# Patient Record
Sex: FEMALE | Race: WHITE | URBAN - METROPOLITAN AREA
[De-identification: names, ages, dates, MRNs, and addresses within clinical notes are randomized per-mention and may not be internally consistent; named-entity substitution may affect disease eponyms.]

---

## 2018-11-02 ENCOUNTER — TRANSFERRED RECORDS (OUTPATIENT)
Dept: HEALTH INFORMATION MANAGEMENT | Facility: CLINIC | Age: 48
End: 2018-11-02

## 2018-12-07 ENCOUNTER — TRANSFERRED RECORDS (OUTPATIENT)
Dept: HEALTH INFORMATION MANAGEMENT | Facility: CLINIC | Age: 48
End: 2018-12-07

## 2018-12-12 ENCOUNTER — MEDICAL CORRESPONDENCE (OUTPATIENT)
Dept: HEALTH INFORMATION MANAGEMENT | Facility: CLINIC | Age: 48
End: 2018-12-12

## 2018-12-12 ENCOUNTER — TRANSFERRED RECORDS (OUTPATIENT)
Dept: HEALTH INFORMATION MANAGEMENT | Facility: CLINIC | Age: 48
End: 2018-12-12

## 2018-12-13 ENCOUNTER — OFFICE VISIT (OUTPATIENT)
Dept: OBGYN | Facility: CLINIC | Age: 48
End: 2018-12-13
Payer: COMMERCIAL

## 2018-12-13 ENCOUNTER — TELEPHONE (OUTPATIENT)
Dept: ONCOLOGY | Facility: CLINIC | Age: 48
End: 2018-12-13

## 2018-12-13 DIAGNOSIS — Z53.9 ERRONEOUS ENCOUNTER--DISREGARD: Primary | ICD-10-CM

## 2018-12-13 NOTE — TELEPHONE ENCOUNTER
ONCOLOGY INTAKE: Records Information      APPT INFORMATION:  Referring provider:  Kiana Ghosh   Referring provider s clinic:  [facility] [phone]  Reason for visit/diagnosis:  AIS of uterine cervix    Were the records received with the referral (via [employer])? Yes    Has patient been seen for any external appt for this diagnosis (enter clinic/location)? Please confirm with PT for further info

## 2018-12-13 NOTE — PATIENT INSTRUCTIONS
If you have any questions regarding your visit, Please contact your care team.    [employer] [facility]: [phone]      [facility] CLINIC HOURS TELEPHONE NUMBER   Livia Rodriguez DO.    THI Orta -    MILLIE Carmichael       Monday, Wednesday, Thursday and Friday, [place]  8:30a.m-5:00 p.m   [facility]  [address] [place] [address]  [phone] ask for [facility]om[facility]    Imaging [phone]       Urgent Care locations:    [facility] Saturday and Sunday   9 am - 5 pm    Monday-Friday   12 pm - 8 pm  Saturday and Sunday   9 am - 5 pm   [phone]     [facility] Labor and Delivery:  [phone]    If you need a medication refill, please contact your pharmacy. Please allow 3 business days for your refill to be completed.  As always, Thank you for trusting us with your healthcare needs!

## 2018-12-17 ENCOUNTER — MEDICAL CORRESPONDENCE (OUTPATIENT)
Dept: HEALTH INFORMATION MANAGEMENT | Facility: CLINIC | Age: 48
End: 2018-12-17

## 2019-01-02 NOTE — TELEPHONE ENCOUNTER
RECORDS STATUS - ALL OTHER DIAGNOSIS      RECORDS RECEIVED FROM:[facility]    DATE RECEIVED:January 2, 2019     NOTES STATUS DETAILS   OFFICE NOTE from referring provider Dr. Galicia - [facility] - records received    OFFICE NOTE from medical oncologist None per pt     DISCHARGE SUMMARY from hospital None per pt     DISCHARGE REPORT from the ER None per pt     OPERATIVE REPORT None per pt     MEDICATION LIST Records from [place]     CLINICAL TRIAL TREATMENTS TO DATE None per pt     LABS     PATHOLOGY REPORTS NM - they will send car when ready.    ANYTHING RELATED TO DIAGNOSIS [facility]     GENONOMIC TESTING     TYPE: None     IMAGING (NEED IMAGES & REPORT)     CT SCANS None per pt     MRI None per pt     MAMMO CE     ULTRASOUND None per pt     PET None per pt

## 2019-01-07 ENCOUNTER — ONCOLOGY VISIT (OUTPATIENT)
Dept: ONCOLOGY | Facility: CLINIC | Age: 49
End: 2019-01-07
Payer: COMMERCIAL

## 2019-01-07 ENCOUNTER — PRE VISIT (OUTPATIENT)
Dept: ONCOLOGY | Facility: CLINIC | Age: 49
End: 2019-01-07

## 2019-01-07 VITALS
RESPIRATION RATE: 16 BRPM | WEIGHT: 156.63 LBS | SYSTOLIC BLOOD PRESSURE: 151 MMHG | OXYGEN SATURATION: 99 % | HEART RATE: 92 BPM | DIASTOLIC BLOOD PRESSURE: 92 MMHG | TEMPERATURE: 98 F

## 2019-01-07 DIAGNOSIS — D06.9 ADENOCARCINOMA IN SITU (AIS) OF UTERINE CERVIX: ICD-10-CM

## 2019-01-07 DIAGNOSIS — R87.810 CERVICAL HIGH RISK HPV (HUMAN PAPILLOMAVIRUS) TEST POSITIVE: ICD-10-CM

## 2019-01-07 PROCEDURE — 88305 TISSUE EXAM BY PATHOLOGIST: CPT | Performed by: OBSTETRICS & GYNECOLOGY

## 2019-01-07 PROCEDURE — 57500 BIOPSY OF CERVIX: CPT | Performed by: OBSTETRICS & GYNECOLOGY

## 2019-01-07 PROCEDURE — 00000346 ZZHCL STATISTIC REVIEW OUTSIDE SLIDES TC 88321: Performed by: OBSTETRICS & GYNECOLOGY

## 2019-01-07 PROCEDURE — 99205 OFFICE O/P NEW HI 60 MIN: CPT | Mod: 25 | Performed by: OBSTETRICS & GYNECOLOGY

## 2019-01-07 RX ORDER — KETOROLAC TROMETHAMINE 30 MG/ML
30 INJECTION, SOLUTION INTRAMUSCULAR; INTRAVENOUS ONCE
Status: CANCELLED | OUTPATIENT
Start: 2019-01-07 | End: 2019-01-07

## 2019-01-07 RX ORDER — ACETAMINOPHEN 325 MG/1
975 TABLET ORAL ONCE
Status: CANCELLED | OUTPATIENT
Start: 2019-01-07 | End: 2019-01-07

## 2019-01-07 ASSESSMENT — PAIN SCALES - GENERAL: PAINLEVEL: NO PAIN (0)

## 2019-01-07 NOTE — NURSING NOTE
Oncology Rooming Note    January 7, 2019 10:07 AM   Cyndee Combs is a 48 year old female who presents for:    Chief Complaint   Patient presents with     Oncology Clinic Visit     New Patient     Initial Vitals: BP (!) 151/92 (BP Location: Right arm)   Pulse 92   Temp 98  F (36.7  C) (Oral)   Resp 16   Wt 71 kg (156 lb 10 oz)   SpO2 99%  There is no height or weight on file to calculate BMI. There is no height or weight on file to calculate BSA.  No Pain (0) Comment: Data Unavailable   No LMP recorded. Patient is not currently having periods (Reason: IUD).  Allergies reviewed: Yes  Medications reviewed: Yes    Medications: Medication refills not needed today.  Pharmacy name entered into EPIC: Data Unavailable      5 minutes for nursing intake (face to face time)     Vicky Stratton LPN

## 2019-01-07 NOTE — NURSING NOTE
Patient Education Note - [facility]    Relevant Diagnosis:  AIS    Teaching Topic:  Exam under anesthesia, Colposcopy, Cold knife cone biopsy    Person(s) involved in teaching:  Patient    Motivation Level:    Asks Questions:   Yes  Eager to Learn:  Yes  Cooperative:  Yes  Receptive (willing/able to accept information):  Yes  Comments:  None    Patient demonstrates understanding of the following:  Reason for the appointment, diagnosis and treatment plan:  Yes  Knowledge of proper use of medications and conditions for which they are ordered (with special attention to potential side effects or drug interactions):  Yes  Which situations necessitate calling provider and whom to contact:  Yes    Teaching Concerns:  No    Education/Instructional Materials Used/Given:     Before Your Surgery Booklet ([place])  Showering Before Surgery, CHG prep provided  Adult Pain Assessment Tool  Home Care after Cervical Cone Biopsy ([place])  [facility] ([place])  Accommodations Brochure   Phone numbers for [facility] and [address] Given to Patient    Tests ordered today: Labs (per Dr. Norwood, do be completed the day of surgery)    Post-op exam: 1-2 weeks after surgery, date TBD    Pre-op exam: N/A    Surgical consent forms faxed to [facility] Pre-Admissions at fax number [phone].  Surgery scheduling staff at [facility] also notified.    Bharat Nelson, RN, BSN, OCN  Oncology Care Coordinator  [facility]

## 2019-01-07 NOTE — PROGRESS NOTES
Consult Notes on Referred Patient    Date: 2019     Dr. Pak referring provider defined for this encounter.     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  Given these findings she was subsequently sent to the Gynecologic Cancer Clinic for new patient consultation.     Brief History:  Cyndee presented for placement of an IUD originally and had a routine pap smear. That pap smear came back abnormal as ascus and subsequent colposcopy was done which revealed AIN and CIN1. She has an IUD in now. She otherwise is healthy - she has no history of heart disease, lung disease and diabetes.     - 2 children, both vaginal deliveries.   - She works as an , lives in [place].     18: colposcopy  Final Diagnosis:  A. Cervix, 12:00, biopsy: adenocarcinoma in situ. Low grade squamous intraepithelial lesion (CIN1)   B. Endocervix, curettage: acute and chronic cervicitis.   ASCUS/HPV18 positive    Recommend CKC to check for skip lesions, other disease, would then recommend hysterectomy.     Review of Systems:  Systemic           no weight changes; no fever; no chills; no night sweats; no appetite changes  Skin           no rashes, or lesions  Eye           no irritation; no changes in vision  Moira-Laryngeal           no dysphagia; no hoarseness   Pulmonary    no cough; no shortness of breath  Cardiovascular    no chest pain; no palpitations  Gastrointestinal    no diarrhea; no constipation; no abdominal pain; no changes in bowel  habits; no blood in stool  Genitourinary   no urinary frequency; no urinary urgency; no dysuria; no pain; no abnormal vaginal discharge; no abnormal vaginal bleeding  Breast   no breast discharge; no breast changes; no breast pain  Musculoskeletal    no myalgias; no arthralgias; no back pain  Psychiatric           no depressed  mood; no anxiety    Hematologic           no tender lymph nodes; no noticeable swellings or lumps   Endocrine    no hot flashes; no heat/cold intolerance         Neurological   no tremor; no numbness and tingling; no headaches; no difficulty  sleeping    I have reviewed and addressed the patient's review of symptoms for today's visit.     Past Medical History:  - DARRICK  - seasonal allergies    Past Surgical History:  No past surgical history     Health Maintenance:  Health Maintenance Due   Topic Date Due     PHQ-2 Q1 YR  01/25/1983     HIV SCREEN (SYSTEM ASSIGNED)  01/25/1989     PAP SCREENING Q3 YR (SYSTEM ASSIGNED)  01/25/1992     DTAP/TDAP/TD IMMUNIZATION (1 - Tdap) 01/25/1996     LIPID SCREEN Q5 YR FEMALE (SYSTEM ASSIGNED)  01/25/2016     INFLUENZA VACCINE (1) 09/01/2018     Last Pap Smear: 11/2/18 Result: ASCUS    Last Mammogram: 7/20/18              Result: normal      She has not had a history of abnormal mammograms.    Last Colonoscopy: Not done                    Current Medications:   has a current medication list which includes the following prescription(s): azithromycin and placebo.     Allergies:   Allergies   Allergen Reactions     Amoxicillin Rash      Social History:  Social History     Tobacco Use     Smoking status: Not on file   Substance Use Topics     Alcohol use: Not on file     History   Drug Use Not on file     Family History:   The patient's family history is notable for:  No family history of cancer.     Physical Exam:   BP (!) 151/92 (BP Location: Right arm)   Pulse 92   Temp 98  F (36.7  C) (Oral)   Resp 16   Wt 71 kg (156 lb 10 oz)   SpO2 99%   There is no height or weight on file to calculate BMI.    General Appearance: healthy and alert, no distress     HEENT:  no thyromegaly, no palpable nodules or masses        Cardiovascular: regular rate and rhythm, no gallops, rubs or murmurs     Respiratory: lungs clear, no rales, rhonchi or wheezes, normal diaphragmatic  excursion    Musculoskeletal: extremities non tender and without edema    Skin: no lesions or rashes     Neurological: normal gait, no gross defects     Psychiatric: appropriate mood and affect                               Hematological: normal cervical, supraclavicular and inguinal lymph nodes     Gastrointestinal:       abdomen soft, non-tender, non-distended, no organomegaly or masses    Genitourinary: External genitalia and urethral meatus appears normal.  Vagina is smooth without nodularity or masses. Raised area at 12-2:00 along cervix, very friable, bleeds easily. Biopsy was done of this area. I am concerned that this could be consistent with possible invasive disease.  Bimanual exam reveals no masses, mild nodularity at 12-2 oclock position, firmness, no parametrial fullness.  Recto-vaginal exam confirms these findings.    Cervical biopsy: consent obtained for procedure. Patient tolerated entire procedure well. Silver Nitrate applied for hemostasis.       Assessment:    Cyndee Combs is a 48 year old woman with a new diagnosis of AIN of cervix and CIN1.     Plan:    1.)   Will plan for cold knife cone at this time. However, if results from cervical biopsy are consistent with invasive disease, we will cancel cold knife cone and go ahead with radical hysterectomy with open abdominal incision, cancer staging and possible lymph node dissection. If we go ahead with ckc, will have patient RTC following surgery to discuss pathology and plan further surgical treatment. Cyndee is understanding of and in agreement with plan.   - Written and verbal consent obtained for cold knife cone of cervix, colposcopy, removal of IUD. Will have Patient RTC to consent for rad hyst if we plan to go ahead with that.   - Pre-op teaching was completed today.  Risks of surgery were discussed to include: bleeding, transfusion, infection, unintentional injury to surrounding organs/structures.     2.) Genetic risk factors were assessed  and the patient does not meet the qualifications for a referral.      3.) Labs and/or tests ordered include:  Pre-op labs. Cervical Biopsy at 2:00.      4.) Health maintenance issues addressed today include: patient is UTD with pap and mammogram. Pt has not had colonoscopy.     5.) BP: 151/92, patient reports she usually has normal BP but she is very anxious today. Instructed to follow up with primary MD regarding this.    Thank you for allowing us to participate in the care of your patient.       Sincerely,    Iraida Norwood MD    Department of Ob/Gyn and Women's Health  Division of Gynecologic Oncology  [facility]  [phone]    CC  Patient Care Team:  Rosalinda Hickman NP as PCP - General      I, Varun Valladares, am serving as a scribe to document services personally performed by Iraida Norwood MD, based upon my observations and the provider's statements to me. All documentation has been reviewed by the aforementioned doctor prior to being entered into the official medical record.     I have reviewed the above note and agree with the scribe's notation as written.

## 2019-01-07 NOTE — LETTER
2019         RE: Cyndee Combs  [address] [facility] [address]        Dear Colleague,    Thank you for referring your patient, Cyndee Combs, to the [facility]. Please see a copy of my visit note below.                            Consult Notes on Referred Patient    Date: 2019     Dr. Pak referring provider defined for this encounter.     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  Given these findings she was subsequently sent to the Gynecologic Cancer Clinic for new patient consultation.     Brief History:  Cyndee presented for placement of an IUD originally and had a routine pap smear. That pap smear came back abnormal as ascus and subsequent colposcopy was done which revealed AIN and CIN1. She has an IUD in now. She otherwise is healthy - she has no history of heart disease, lung disease and diabetes.     - 2 children, both vaginal deliveries.   - She works as an , lives in [place].     18: colposcopy  Final Diagnosis:  A. Cervix, 12:00, biopsy: adenocarcinoma in situ. Low grade squamous intraepithelial lesion (CIN1)   B. Endocervix, curettage: acute and chronic cervicitis.   ASCUS/HPV18 positive    Recommend CKC to check for skip lesions, other disease, would then recommend hysterectomy.     Review of Systems:  Systemic           no weight changes; no fever; no chills; no night sweats; no appetite changes  Skin           no rashes, or lesions  Eye           no irritation; no changes in vision  Moira-Laryngeal           no dysphagia; no hoarseness   Pulmonary    no cough; no shortness of breath  Cardiovascular    no chest pain; no palpitations  Gastrointestinal    no diarrhea; no constipation; no abdominal pain; no changes in bowel  habits; no blood in stool  Genitourinary   no urinary frequency; no urinary urgency; no  dysuria; no pain; no abnormal vaginal discharge; no abnormal vaginal bleeding  Breast   no breast discharge; no breast changes; no breast pain  Musculoskeletal    no myalgias; no arthralgias; no back pain  Psychiatric           no depressed mood; no anxiety    Hematologic           no tender lymph nodes; no noticeable swellings or lumps   Endocrine    no hot flashes; no heat/cold intolerance         Neurological   no tremor; no numbness and tingling; no headaches; no difficulty  sleeping    I have reviewed and addressed the patient's review of symptoms for today's visit.     Past Medical History:  - DARRICK  - seasonal allergies    Past Surgical History:  No past surgical history     Health Maintenance:  Health Maintenance Due   Topic Date Due     PHQ-2 Q1 YR  01/25/1983     HIV SCREEN (SYSTEM ASSIGNED)  01/25/1989     PAP SCREENING Q3 YR (SYSTEM ASSIGNED)  01/25/1992     DTAP/TDAP/TD IMMUNIZATION (1 - Tdap) 01/25/1996     LIPID SCREEN Q5 YR FEMALE (SYSTEM ASSIGNED)  01/25/2016     INFLUENZA VACCINE (1) 09/01/2018     Last Pap Smear: 11/2/18 Result: ASCUS    Last Mammogram: 7/20/18              Result: normal      She has not had a history of abnormal mammograms.    Last Colonoscopy: Not done                    Current Medications:   has a current medication list which includes the following prescription(s): azithromycin and placebo.     Allergies:   Allergies   Allergen Reactions     Amoxicillin Rash      Social History:  Social History     Tobacco Use     Smoking status: Not on file   Substance Use Topics     Alcohol use: Not on file     History   Drug Use Not on file     Family History:   The patient's family history is notable for:  No family history of cancer.     Physical Exam:   BP (!) 151/92 (BP Location: Right arm)   Pulse 92   Temp 98  F (36.7  C) (Oral)   Resp 16   Wt 71 kg (156 lb 10 oz)   SpO2 99%   There is no height or weight on file to calculate BMI.    General Appearance: healthy and alert, no  distress     HEENT:  no thyromegaly, no palpable nodules or masses        Cardiovascular: regular rate and rhythm, no gallops, rubs or murmurs     Respiratory: lungs clear, no rales, rhonchi or wheezes, normal diaphragmatic excursion    Musculoskeletal: extremities non tender and without edema    Skin: no lesions or rashes     Neurological: normal gait, no gross defects     Psychiatric: appropriate mood and affect                               Hematological: normal cervical, supraclavicular and inguinal lymph nodes     Gastrointestinal:       abdomen soft, non-tender, non-distended, no organomegaly or masses    Genitourinary: External genitalia and urethral meatus appears normal.  Vagina is smooth without nodularity or masses. Raised area at 12-2:00 along cervix, very friable, bleeds easily. Biopsy was done of this area. I am concerned that this could be consistent with possible invasive disease.  Bimanual exam reveals no masses, mild nodularity at 12-2 oclock position, firmness, no parametrial fullness.  Recto-vaginal exam confirms these findings.    Cervical biopsy: consent obtained for procedure. Patient tolerated entire procedure well. Silver Nitrate applied for hemostasis.       Assessment:    Cyndee Combs is a 48 year old woman with a new diagnosis of AIN of cervix and CIN1.     Plan:    1.)   Will plan for cold knife cone at this time. However, if results from cervical biopsy are consistent with invasive disease, we will cancel cold knife cone and go ahead with radical hysterectomy with open abdominal incision, cancer staging and possible lymph node dissection. If we go ahead with ckc, will have patient RTC following surgery to discuss pathology and plan further surgical treatment. Cyndee is understanding of and in agreement with plan.   - Written and verbal consent obtained for cold knife cone of cervix, colposcopy, removal of IUD. Will have Patient RTC to consent for rad hyst if we plan to go ahead with  that.   - Pre-op teaching was completed today.  Risks of surgery were discussed to include: bleeding, transfusion, infection, unintentional injury to surrounding organs/structures.     2.) Genetic risk factors were assessed and the patient does not meet the qualifications for a referral.      3.) Labs and/or tests ordered include:  Pre-op labs. Cervical Biopsy at 2:00.      4.) Health maintenance issues addressed today include: patient is UTD with pap and mammogram. Pt has not had colonoscopy.     5.) BP: 151/92, patient reports she usually has normal BP but she is very anxious today. Instructed to follow up with primary MD regarding this.    Thank you for allowing us to participate in the care of your patient.       Sincerely,    Iraida Norwood MD    Department of Ob/Gyn and Women's Health  Division of Gynecologic Oncology  [facility]  [phone]      Patient Care Team:  Rosalinda Hickman NP as PCP - General      I, Varun Valladares, am serving as a scribe to document services personally performed by Iraida Norwood MD, based upon my observations and the provider's statements to me. All documentation has been reviewed by the aforementioned doctor prior to being entered into the official medical record.     I have reviewed the above note and agree with the scribe's notation as written.      Again, thank you for allowing me to participate in the care of your patient.        Sincerely,        Iraida Norwood MD

## 2019-01-10 LAB
COPATH REPORT: NORMAL
COPATH REPORT: NORMAL

## 2019-01-16 ENCOUNTER — ANESTHESIA EVENT (OUTPATIENT)
Dept: SURGERY | Facility: AMBULATORY SURGERY CENTER | Age: 49
End: 2019-01-16

## 2019-01-16 RX ORDER — ACETAMINOPHEN 325 MG/1
975 TABLET ORAL ONCE
Status: CANCELLED | OUTPATIENT
Start: 2019-01-16 | End: 2019-01-16

## 2019-01-17 ENCOUNTER — HOSPITAL ENCOUNTER (OUTPATIENT)
Facility: AMBULATORY SURGERY CENTER | Age: 49
End: 2019-01-17
Attending: OBSTETRICS & GYNECOLOGY
Payer: COMMERCIAL

## 2019-01-17 ENCOUNTER — ANESTHESIA (OUTPATIENT)
Dept: SURGERY | Facility: AMBULATORY SURGERY CENTER | Age: 49
End: 2019-01-17

## 2019-01-17 VITALS
RESPIRATION RATE: 16 BRPM | TEMPERATURE: 98.1 F | OXYGEN SATURATION: 97 % | WEIGHT: 156 LBS | SYSTOLIC BLOOD PRESSURE: 132 MMHG | DIASTOLIC BLOOD PRESSURE: 86 MMHG | HEIGHT: 66 IN | BODY MASS INDEX: 25.07 KG/M2 | HEART RATE: 102 BPM

## 2019-01-17 DIAGNOSIS — D06.9 ADENOCARCINOMA IN SITU (AIS) OF UTERINE CERVIX: ICD-10-CM

## 2019-01-17 DIAGNOSIS — D06.9 ADENOCARCINOMA IN SITU (AIS) OF UTERINE CERVIX: Primary | ICD-10-CM

## 2019-01-17 LAB
ABO + RH BLD: NORMAL
ABO + RH BLD: NORMAL
B-HCG SERPL-ACNC: <1 IU/L (ref 0–5)
B-HCG SERPL-ACNC: <1 IU/L (ref 0–5)
BASOPHILS # BLD AUTO: 0 10E9/L (ref 0–0.2)
BASOPHILS NFR BLD AUTO: 0.4 %
BLD GP AB SCN SERPL QL: NORMAL
BLOOD BANK CMNT PATIENT-IMP: NORMAL
DIFFERENTIAL METHOD BLD: NORMAL
EOSINOPHIL # BLD AUTO: 0.2 10E9/L (ref 0–0.7)
EOSINOPHIL NFR BLD AUTO: 2.3 %
ERYTHROCYTE [DISTWIDTH] IN BLOOD BY AUTOMATED COUNT: 12.7 % (ref 10–15)
HCG SERPL QL: NEGATIVE
HCT VFR BLD AUTO: 45.4 % (ref 35–47)
HGB BLD-MCNC: 15.4 G/DL (ref 11.7–15.7)
IMM GRANULOCYTES # BLD: 0 10E9/L (ref 0–0.4)
IMM GRANULOCYTES NFR BLD: 0.4 %
LYMPHOCYTES # BLD AUTO: 2 10E9/L (ref 0.8–5.3)
LYMPHOCYTES NFR BLD AUTO: 23.7 %
MCH RBC QN AUTO: 31.4 PG (ref 26.5–33)
MCHC RBC AUTO-ENTMCNC: 33.9 G/DL (ref 31.5–36.5)
MCV RBC AUTO: 93 FL (ref 78–100)
MONOCYTES # BLD AUTO: 0.7 10E9/L (ref 0–1.3)
MONOCYTES NFR BLD AUTO: 8.8 %
NEUTROPHILS # BLD AUTO: 5.3 10E9/L (ref 1.6–8.3)
NEUTROPHILS NFR BLD AUTO: 64.4 %
NRBC # BLD AUTO: 0 10*3/UL
NRBC BLD AUTO-RTO: 0 /100
PLATELET # BLD AUTO: 226 10E9/L (ref 150–450)
RBC # BLD AUTO: 4.9 10E12/L (ref 3.8–5.2)
SPECIMEN EXP DATE BLD: NORMAL
WBC # BLD AUTO: 8.3 10E9/L (ref 4–11)

## 2019-01-17 PROCEDURE — 86901 BLOOD TYPING SEROLOGIC RH(D): CPT | Performed by: OBSTETRICS & GYNECOLOGY

## 2019-01-17 PROCEDURE — 86900 BLOOD TYPING SEROLOGIC ABO: CPT | Performed by: OBSTETRICS & GYNECOLOGY

## 2019-01-17 PROCEDURE — 84702 CHORIONIC GONADOTROPIN TEST: CPT | Performed by: OBSTETRICS & GYNECOLOGY

## 2019-01-17 PROCEDURE — 86850 RBC ANTIBODY SCREEN: CPT | Performed by: OBSTETRICS & GYNECOLOGY

## 2019-01-17 PROCEDURE — 85025 COMPLETE CBC W/AUTO DIFF WBC: CPT | Performed by: OBSTETRICS & GYNECOLOGY

## 2019-01-17 RX ORDER — ONDANSETRON 2 MG/ML
INJECTION INTRAMUSCULAR; INTRAVENOUS PRN
Status: DISCONTINUED | OUTPATIENT
Start: 2019-01-17 | End: 2019-01-17

## 2019-01-17 RX ORDER — IODINE AND POTASSIUM IODIDE 50; 100 MG/ML; MG/ML
LIQUID ORAL PRN
Status: DISCONTINUED | OUTPATIENT
Start: 2019-01-17 | End: 2019-01-17 | Stop reason: HOSPADM

## 2019-01-17 RX ORDER — MEPERIDINE HYDROCHLORIDE 25 MG/ML
12.5 INJECTION INTRAMUSCULAR; INTRAVENOUS; SUBCUTANEOUS
Status: DISCONTINUED | OUTPATIENT
Start: 2019-01-17 | End: 2019-01-18 | Stop reason: HOSPADM

## 2019-01-17 RX ORDER — ONDANSETRON 4 MG/1
4 TABLET, ORALLY DISINTEGRATING ORAL
Status: DISCONTINUED | OUTPATIENT
Start: 2019-01-17 | End: 2019-01-18 | Stop reason: HOSPADM

## 2019-01-17 RX ORDER — BUPIVACAINE HYDROCHLORIDE 5 MG/ML
INJECTION, SOLUTION PERINEURAL PRN
Status: DISCONTINUED | OUTPATIENT
Start: 2019-01-17 | End: 2019-01-17 | Stop reason: HOSPADM

## 2019-01-17 RX ORDER — KETOROLAC TROMETHAMINE 30 MG/ML
30 INJECTION, SOLUTION INTRAMUSCULAR; INTRAVENOUS ONCE
Status: COMPLETED | OUTPATIENT
Start: 2019-01-17 | End: 2019-01-17

## 2019-01-17 RX ORDER — SODIUM CHLORIDE, SODIUM LACTATE, POTASSIUM CHLORIDE, CALCIUM CHLORIDE 600; 310; 30; 20 MG/100ML; MG/100ML; MG/100ML; MG/100ML
INJECTION, SOLUTION INTRAVENOUS CONTINUOUS
Status: DISCONTINUED | OUTPATIENT
Start: 2019-01-17 | End: 2019-01-17 | Stop reason: HOSPADM

## 2019-01-17 RX ORDER — OXYCODONE HYDROCHLORIDE 5 MG/1
5 TABLET ORAL
Status: DISCONTINUED | OUTPATIENT
Start: 2019-01-17 | End: 2019-01-18 | Stop reason: HOSPADM

## 2019-01-17 RX ORDER — FERRIC SUBSULFATE 0.21 G/G
LIQUID TOPICAL PRN
Status: DISCONTINUED | OUTPATIENT
Start: 2019-01-17 | End: 2019-01-17 | Stop reason: HOSPADM

## 2019-01-17 RX ORDER — IBUPROFEN 600 MG/1
600 TABLET, FILM COATED ORAL EVERY 6 HOURS PRN
Qty: 30 TABLET | Refills: 0 | Status: ON HOLD | OUTPATIENT
Start: 2019-01-17 | End: 2019-03-06

## 2019-01-17 RX ORDER — ACETAMINOPHEN 325 MG/1
975 TABLET ORAL ONCE
Status: COMPLETED | OUTPATIENT
Start: 2019-01-17 | End: 2019-01-17

## 2019-01-17 RX ORDER — DEXAMETHASONE SODIUM PHOSPHATE 4 MG/ML
INJECTION, SOLUTION INTRA-ARTICULAR; INTRALESIONAL; INTRAMUSCULAR; INTRAVENOUS; SOFT TISSUE PRN
Status: DISCONTINUED | OUTPATIENT
Start: 2019-01-17 | End: 2019-01-17

## 2019-01-17 RX ORDER — SCOLOPAMINE TRANSDERMAL SYSTEM 1 MG/1
1 PATCH, EXTENDED RELEASE TRANSDERMAL ONCE
Status: COMPLETED | OUTPATIENT
Start: 2019-01-17 | End: 2019-01-17

## 2019-01-17 RX ORDER — KETOROLAC TROMETHAMINE 30 MG/ML
INJECTION, SOLUTION INTRAMUSCULAR; INTRAVENOUS PRN
Status: DISCONTINUED | OUTPATIENT
Start: 2019-01-17 | End: 2019-01-17

## 2019-01-17 RX ORDER — ONDANSETRON 2 MG/ML
4 INJECTION INTRAMUSCULAR; INTRAVENOUS EVERY 30 MIN PRN
Status: DISCONTINUED | OUTPATIENT
Start: 2019-01-17 | End: 2019-01-18 | Stop reason: HOSPADM

## 2019-01-17 RX ORDER — FENTANYL CITRATE 50 UG/ML
INJECTION, SOLUTION INTRAMUSCULAR; INTRAVENOUS PRN
Status: DISCONTINUED | OUTPATIENT
Start: 2019-01-17 | End: 2019-01-17

## 2019-01-17 RX ORDER — PROPOFOL 10 MG/ML
INJECTION, EMULSION INTRAVENOUS CONTINUOUS PRN
Status: DISCONTINUED | OUTPATIENT
Start: 2019-01-17 | End: 2019-01-17

## 2019-01-17 RX ORDER — LIDOCAINE 40 MG/G
CREAM TOPICAL
Status: DISCONTINUED | OUTPATIENT
Start: 2019-01-17 | End: 2019-01-17 | Stop reason: HOSPADM

## 2019-01-17 RX ORDER — FENTANYL CITRATE 50 UG/ML
25-50 INJECTION, SOLUTION INTRAMUSCULAR; INTRAVENOUS
Status: DISCONTINUED | OUTPATIENT
Start: 2019-01-17 | End: 2019-01-17 | Stop reason: HOSPADM

## 2019-01-17 RX ORDER — ONDANSETRON 4 MG/1
4 TABLET, ORALLY DISINTEGRATING ORAL EVERY 30 MIN PRN
Status: DISCONTINUED | OUTPATIENT
Start: 2019-01-17 | End: 2019-01-18 | Stop reason: HOSPADM

## 2019-01-17 RX ORDER — OXYCODONE HYDROCHLORIDE 5 MG/1
5 TABLET ORAL EVERY 4 HOURS PRN
Status: DISCONTINUED | OUTPATIENT
Start: 2019-01-17 | End: 2019-01-18 | Stop reason: HOSPADM

## 2019-01-17 RX ORDER — LIDOCAINE HYDROCHLORIDE 20 MG/ML
INJECTION, SOLUTION INFILTRATION; PERINEURAL PRN
Status: DISCONTINUED | OUTPATIENT
Start: 2019-01-17 | End: 2019-01-17

## 2019-01-17 RX ORDER — GABAPENTIN 300 MG/1
300 CAPSULE ORAL ONCE
Status: COMPLETED | OUTPATIENT
Start: 2019-01-17 | End: 2019-01-17

## 2019-01-17 RX ORDER — ACETIC ACID 5 %
LIQUID (ML) MISCELLANEOUS PRN
Status: DISCONTINUED | OUTPATIENT
Start: 2019-01-17 | End: 2019-01-17 | Stop reason: HOSPADM

## 2019-01-17 RX ORDER — PROPOFOL 10 MG/ML
INJECTION, EMULSION INTRAVENOUS PRN
Status: DISCONTINUED | OUTPATIENT
Start: 2019-01-17 | End: 2019-01-17

## 2019-01-17 RX ORDER — IBUPROFEN 200 MG
600 TABLET ORAL
Status: DISCONTINUED | OUTPATIENT
Start: 2019-01-17 | End: 2019-01-18 | Stop reason: HOSPADM

## 2019-01-17 RX ORDER — SODIUM CHLORIDE, SODIUM LACTATE, POTASSIUM CHLORIDE, CALCIUM CHLORIDE 600; 310; 30; 20 MG/100ML; MG/100ML; MG/100ML; MG/100ML
INJECTION, SOLUTION INTRAVENOUS CONTINUOUS
Status: DISCONTINUED | OUTPATIENT
Start: 2019-01-17 | End: 2019-01-18 | Stop reason: HOSPADM

## 2019-01-17 RX ORDER — NALOXONE HYDROCHLORIDE 0.4 MG/ML
.1-.4 INJECTION, SOLUTION INTRAMUSCULAR; INTRAVENOUS; SUBCUTANEOUS
Status: DISCONTINUED | OUTPATIENT
Start: 2019-01-17 | End: 2019-01-18 | Stop reason: HOSPADM

## 2019-01-17 RX ADMIN — KETOROLAC TROMETHAMINE 30 MG: 30 INJECTION, SOLUTION INTRAMUSCULAR; INTRAVENOUS at 10:10

## 2019-01-17 RX ADMIN — FENTANYL CITRATE 50 MCG: 50 INJECTION, SOLUTION INTRAMUSCULAR; INTRAVENOUS at 10:50

## 2019-01-17 RX ADMIN — KETOROLAC TROMETHAMINE 15 MG: 30 INJECTION, SOLUTION INTRAMUSCULAR; INTRAVENOUS at 11:24

## 2019-01-17 RX ADMIN — ONDANSETRON 4 MG: 2 INJECTION INTRAMUSCULAR; INTRAVENOUS at 11:19

## 2019-01-17 RX ADMIN — SCOLOPAMINE TRANSDERMAL SYSTEM 1 PATCH: 1 PATCH, EXTENDED RELEASE TRANSDERMAL at 10:10

## 2019-01-17 RX ADMIN — LIDOCAINE HYDROCHLORIDE 60 MG: 20 INJECTION, SOLUTION INFILTRATION; PERINEURAL at 10:50

## 2019-01-17 RX ADMIN — DEXAMETHASONE SODIUM PHOSPHATE 4 MG: 4 INJECTION, SOLUTION INTRA-ARTICULAR; INTRALESIONAL; INTRAMUSCULAR; INTRAVENOUS; SOFT TISSUE at 10:56

## 2019-01-17 RX ADMIN — Medication 100 MCG: at 11:34

## 2019-01-17 RX ADMIN — PROPOFOL 200 MCG/KG/MIN: 10 INJECTION, EMULSION INTRAVENOUS at 10:50

## 2019-01-17 RX ADMIN — SODIUM CHLORIDE, SODIUM LACTATE, POTASSIUM CHLORIDE, CALCIUM CHLORIDE: 600; 310; 30; 20 INJECTION, SOLUTION INTRAVENOUS at 10:09

## 2019-01-17 RX ADMIN — PROPOFOL 75 MCG/KG/MIN: 10 INJECTION, EMULSION INTRAVENOUS at 11:29

## 2019-01-17 RX ADMIN — PROPOFOL 200 MG: 10 INJECTION, EMULSION INTRAVENOUS at 10:50

## 2019-01-17 RX ADMIN — GABAPENTIN 300 MG: 300 CAPSULE ORAL at 10:09

## 2019-01-17 RX ADMIN — ACETAMINOPHEN 975 MG: 325 TABLET ORAL at 10:09

## 2019-01-17 ASSESSMENT — LIFESTYLE VARIABLES: TOBACCO_USE: 0

## 2019-01-17 ASSESSMENT — COPD QUESTIONNAIRES: COPD: 0

## 2019-01-17 ASSESSMENT — MIFFLIN-ST. JEOR: SCORE: 1359.36

## 2019-01-17 NOTE — OP NOTE
GYNECOLOGIC ONCOLOGY OPERATIVE NOTE    PATIENT: Cyndee Combs  MRN: [id number]  DATE OF SURGERY: 01/17/19                   PREOPERATIVE DIAGNOSES:   1. Adenocarcinoma in situ     POSTOPERATIVE DIAGNOSES:   1. Adenocarcinoma in situ     OPERATIVE PROCEDURES:   1. Exam under anesthesia  2. Colposcopy  3. Cold knife cone     SURGEON: Iraida Norwood MD      ASSISTANTS:   1. Wilfredo Yanes MD, 3rd year fellow     ANESTHESIA: Moderate sedation     ESTIMATED BLOOD LOSS: 50 mL     TOTAL INTRAVENOUS FLUIDS: crystalloid 1000 mL      TOTAL URINE OUTPUT: 150 mL, clear urine     TRANSFUSIONS: none     DRAIN: none      SPECIMENS REMOVED: ectocervix with stitch at 12 o'clock; ECC     OPERATIVE FINDINGS:   On exam under anesthesia, external genitalia and urethral meatus appears normal. On speculum, normal appearing cervix without obvious mass. On colposcopy, entire TZ visualized after application of acetic acid, followed by Lugols. There were fingers of metaplasia noted at 12 o'clock. Excised entire TZ intact with cold knife cone. Collected ECC following procedure. At end of case, hemostasis assured.      COMPLICATIONS: None noted.      CONDITION: Stable on transfer.      INDICATIONS FOR PROCEDURE: This patient is a 48 year old with known AIS noted on colposcopy with biopsy. She was referred to Gynecologic Oncology clinic. Treatment options were discussed and she was consented for the above procedure.     OPERATIVE PROCEDURE IN DETAIL: The consent was reviewed with the patient in the preoperative setting and confirmed. She was transferred to the operating room and placed on the operating room table in the dorsal lithotomy position. Moderate sedation administered in the usual manner. The patient was then prepped and draped in the standard fashion. A timeout was called at which point the patient's name, operative procedure and site was confirmed by the operative team.      On exam under anesthesia, external genitalia and  urethral meatus appears normal. On speculum, normal appearing cervix without obvious mass. On colposcopy, entire TZ visualized after application of acetic acid, followed by Lugols. There were fingers of metaplasia noted at 12 o'clock. No abnormal punctation or mosaicism noted. We then placed hemostatic stay sutures at 3 and 9 o'clock. The cervix was then grasped with single tooth tenaculum and then we injected the cervix, circumferentially, with 0.5% bupivacaine. We then performed a cold knife cone with an #11 blade, starting at 6 o'clock and working toward 12 o'clock. Specimen was removed intact and tagged with a stitch at 12 o'clock. The entire TZ intact was removed. We then collected ECC following procedure.     We then used roller-ball to cauterize the bed to achieve hemostasis. We then applied monsels and gelfoam to the bed, and tied the stay sutures. Pressure was then applied to achieve complete hemostasis. We waited 2 minutes following the procedure to make sure she was not bleeding. At end of case, hemostasis assured.      Dr. Norwood was present for the entire procedure.    Wilfredo Yanes MD  Gynecologic Oncology Fellow  1/17/2019 11:59 AM    Iraida Norwood MD    Department of Ob/Gyn and Women's Health  Division of Gynecologic Oncology  [facility]  [phone]    I was scrubbed and present for the entire procedure.

## 2019-01-17 NOTE — ANESTHESIA POSTPROCEDURE EVALUATION
Anesthesia POST Procedure Evaluation    Patient: Cyndee Combs   MRN:     [id number] Gender:   female   Age:    48 year old :      1970        Preoperative Diagnosis: Adenocarcinoma in Situ   Procedure(s):  Exam Under Anesthesia,Removal IUD, Colposcopy, Cold Knife Cone Biopsy, Currettings   Postop Comments: No value filed.       Anesthesia Type:  General    Reportable Event: NO     PAIN: Uncomplicated   Sign Out status: Comfortable, Well controlled pain     PONV: No PONV   Sign Out status:  No Nausea or Vomiting     Neuro/Psych: Uneventful perioperative course   Sign Out Status: Preoperative baseline; Age appropriate mentation     Airway/Resp.: Uneventful perioperative course   Sign Out Status: Non labored breathing, age appropriate RR; Resp. Status within EXPECTED Parameters     CV: Uneventful perioperative course   Sign Out status: Appropriate BP and perfusion indices; Appropriate HR/Rhythm     Disposition:   Sign Out in:  Phase II  Disposition:  Home  Recovery Course: Uneventful  Follow-Up: Not required           Last Anesthesia Record Vitals:  CRNA VITALS  2019 1115 - 2019 1215      2019             Pulse:  72    Ht Rate:  73    Temp:  35.8  C (96.4  F)    SpO2:  99 %               Last PACU/Preop Vitals:  Vitals:    19 1148 19 1152 19 1155   BP:  129/81 132/86   Pulse:      Resp: 14 14 16   Temp: 36.9  C (98.4  F) 36.9  C (98.4  F) 36.7  C (98.1  F)   SpO2:  95% 97%         Electronically Signed By: Stone Fox MD, 2019, 1:19 PM

## 2019-01-17 NOTE — NURSING NOTE
Chief Complaint   Patient presents with     Blood Draw     Labs drawn via  by RN in lab. VS taken.     Winifred Hewitt RN

## 2019-01-17 NOTE — DISCHARGE INSTRUCTIONS
[facility]  Home Care Following Anesthesia  For 24 hours after surgery:  1. Get plenty of rest.  A responsible adult must stay with you for at least 24 hours after you leave the surgery center.  2. Do not drive or use heavy equipment.  If you have weakness or tingling, don't drive or use heavy equipment until this feeling goes away.   3. Do not drink alcohol.   4. Avoid strenuous or risky activities.  Ask for help when climbing stairs.  5. You may feel lightheaded.  IF so, sit for a few minutes before standing.  Have someone help you get up.   6. If you have nausea (feel sick to your stomach): Drink only clear liquids such as apple juice, ginger ale, broth or 7-Up.  Rest may also help.  Be sure to drink enough fluids.  Move to a regular diet as you feel able.   7. You may have a slight fever.  Call the doctor if your fever is over 100 F (37.7 C) (taken under the tongue) or lasts longer than 24 hours.  8. You may have a dry mouth, a sore throat, muscle aches or trouble sleeping. These should go away after 24 hours.  9. Do not make important or legal decisions.               Tips for taking pain medications  To get the best pain relief possible, remember these points:    Take pain medications as directed, before pain becomes severe.    Pain medication can upset your stomach: taking it with food may help.    Constipation is a common side effect of pain medication. Drink plenty of  fluids.    Eat foods high in fiber. Take a stool softener if recommended by your doctor or pharmacist.    Do not drink alcohol, drive or operate machinery while taking pain medications.    Ask about other ways to control pain, such as with heat, ice or relaxation.    Tylenol/Acetaminophen Consumption  To help encourage the safe use of acetaminophen, the makers of TYLENOL  have lowered the maximum daily dose for single-ingredient Extra Strength TYLENOL  (acetaminophen) products sold in the [place] from 8  pills per day (4,000 mg) to 6 pills per day (3,000 mg). The dosing interval has also changed from 2 pills every 4-6 hours to 2 pills every 6 hours.    If you feel your pain relief is insufficient, you may take Tylenol/Acetaminophen in addition to your narcotic pain medication.     Be careful not to exceed 3,000 mg of Tylenol/Acetaminophen in a 24 hour period from all sources.    If you are taking extra strength Tylenol/acetaminophen (500 mg), the maximum dose is 6 tablets in 24 hours.    If you are taking regular strength acetaminophen (325 mg), the maximum dose is 9 tablets in 24 hours.    Call a doctor for any of the followin. Signs of infection (fever, growing tenderness at the surgery site, a large amount of drainage or bleeding, severe pain, foul-smelling drainage, redness, swelling).  2. It has been over 8 to 10 hours since surgery and you are still not able to urinate (pass water).  3. Headache for over 24 hours.  4. Numbness, tingling or weakness the day after surgery (if you had spinal anesthesia).  Your doctor is:  Dr. Iraida Norwood, Gynecologic Oncology: [phone]                    Or dial [phone] and ask for the resident on call for:  Gynecologic Oncology  For emergency care, call the:  [facility]:  [phone] (TTY for hearing impaired: [phone])            Scopolamine Patch- (Absorbed through the skin)    This medicine prevents nausea and vomiting caused by motion sickness or anesthesia.  The medicine is in a patch worn behind the ear.      Do NOT use the Scopolamine Patch if you have glaucoma or are allergic to scopolamine.    How to Use This Medicine:    The patch is applied behind the ear.    Keep the patch dry to prevent it from falling off.  Limit contact with water (no bathing or swimming).      If the patch is loose or falls off throw it away.  You do not need to apply a new patch.    After you take off the patch or if it falls off, wash your hands and the  area behind your ear with soap and water.      You can remove the patch tomorrow, or leave on for up to 3 days.    Only one patch should be used at any time.    How to Dispose of This Medicine:    Fold the used patch in half with the sticky sides together. Throw any used patch away so that children or pets cannot get to it. You will also need to throw away old patches after the expiration date has passed.    Keep all medicine away from children and never share your medicine with anyone.    Warnings While Using This Medicine:    This medicine can make you sleepy.  Avoid taking sleeping pills and other medicines that can make you sleepy while the patch is on.    Do not drink alcohol while the patch is on.    This medicine can cause temporary blurring and other vision problems if it comes in contact with the eyes.  This is not serious unless accompanied by eye pain and redness.     This medicine may cause problems with urination. If you have problems with urinating, remove the patch.  If you are unable to urinate, call your doctor.      This medicine may make you dizzy or drowsy. Avoid driving, using machines, or doing anything else that could be dangerous if the patch is on.    This medicine may make you sweat less and cause your body to get too hot. Be careful in hot weather or if you are exercising.    Make sure any doctor or dentist who treats you knows that you have the patch on. This medicine may affect the results of certain medical tests.    Skin burns have been reported at the patch site in several patients wearing an aluminized transdermal system during a magnetic resonance imaging scan (MRI).  Since this patch contains aluminum, it is recommended to remove the patch if you are having an MRI.    Possible Side Effects While Using This Medicine:    Dry mouth    Drowsiness    Temporary blurring of vision and widening of the pupils    Call your doctor right away if you notice any of these side  effects:    Allergic reaction: Itching or hives, swelling in your face or hands, swelling or tingling in your mouth or throat, chest tightness, trouble breathing.    Blurred vision that does not go away after the patch is removed    Confusion or memory loss    Fast,slow, or uneven heartbeat    Lightheadedness, dizziness, drowsiness, or fainting    Seeing, hearing, or feeling things that are not there    Restlessness    Severe eye pain    Trouble urinating    If you notice other side effects that you think are caused by this medicine, call your doctor immediately.

## 2019-01-17 NOTE — ANESTHESIA CARE TRANSFER NOTE
Patient: Cyndee Combs    Procedure(s):  Exam Under Anesthesia,Removal IUD, Colposcopy, Cold Knife Cone Biopsy, Currettings    Diagnosis: Adenocarcinoma in Situ  Diagnosis Additional Information: No value filed.    Anesthesia Type:   No value filed.     Note:  Airway :Room Air  Patient transferred to:PACU  Comments: VSS. Report to RN.       Vitals: (Last set prior to Anesthesia Care Transfer)    CRNA VITALS  1/17/2019 1115 - 1/17/2019 1152      1/17/2019             Pulse:  72    Ht Rate:  73    Temp:  35.8  C (96.4  F)    SpO2:  99 %    Resp Rate (observed):  7  (Abnormal)                 Electronically Signed By: MAX Cheung CRNA  January 17, 2019  11:52 AM

## 2019-01-17 NOTE — ANESTHESIA PREPROCEDURE EVALUATION
Anesthesia Pre-Procedure Evaluation    Patient: Cyndee Combs   MRN:     [id number] Gender:   female   Age:    48 year old :      1970        Preoperative Diagnosis: Adenocarcinoma in Situ   Procedure(s):  Exam Under Anesthesia, Colposcopy, Cold Knife Cone Biopsy     Past Medical History:   Diagnosis Date     PONV (postoperative nausea and vomiting)       History reviewed. No pertinent surgical history.       Anesthesia Evaluation     . Pt has had prior anesthetic. Type: General    History of anesthetic complications   - PONV        ROS/MED HX    ENT/Pulmonary:      (-) tobacco use, asthma and COPD   Neurologic:      (-) CVA, TIA and Neuropathy   Cardiovascular:        (-) hypertension, CAD, irregular heartbeat/palpitations and stent   METS/Exercise Tolerance:     Hematologic:        (-) anemia   Musculoskeletal:         GI/Hepatic:        (-) GERD and liver disease   Renal/Genitourinary:     (+) Other Renal/ Genitourinary,    (-) renal disease   Endo:      (-) Type I DM, Type II DM and thyroid disease   Psychiatric:         Infectious Disease:  - neg infectious disease ROS       Malignancy:         Other:                         PHYSICAL EXAM:   Mental Status/Neuro: A/A/O   Airway: Facies: Feasible  Mallampati: I  Mouth/Opening: Full  TM distance: > 6 cm  Neck ROM: Full   Respiratory: Auscultation: CTAB     Resp. Rate: Normal     Resp. Effort: Normal      CV: Rhythm: Regular  Rate: Age appropriate  Heart: Normal Sounds   Comments:      Dental: Normal                  Lab Results   Component Value Date    WBC 8.3 2019    HGB 15.4 2019    HCT 45.4 2019     2019       Preop Vitals  BP Readings from Last 3 Encounters:   19 166/85   19 (!) 151/92   08 123/83    Pulse Readings from Last 3 Encounters:   19 102   19 92   08 102      Resp Readings from Last 3 Encounters:   19 16   19 16   08 20    SpO2 Readings from Last 3  "Encounters:   01/17/19 98%   01/07/19 99%      Temp Readings from Last 1 Encounters:   01/17/19 37.1  C (98.7  F) (Oral)    Ht Readings from Last 1 Encounters:   01/17/19 1.676 m (5' 6\")      Wt Readings from Last 1 Encounters:   01/17/19 70.8 kg (156 lb)    Estimated body mass index is 25.18 kg/m  as calculated from the following:    Height as of this encounter: 1.676 m (5' 6\").    Weight as of this encounter: 70.8 kg (156 lb).     LDA:  Airway - Adult/Peds laryngeal mask airway (Active)   Number of days: 0            Assessment:   ASA SCORE: 2    NPO Status: > 2 hours since completed Clear Liquids; > 6 hours since completed Solid Foods   Documentation: H&P complete; Preop Testing complete; Consents complete   Proceeding: Proceed without further delay  Tobacco Use:  NO Active use of Tobacco/UNKNOWN Tobacco use status     Plan:   Anes. Type:  General   Pre-Induction: Acetaminophen PO   Induction:  IV (Standard)   Airway: LMA   Access/Monitoring: PIV   Maintenance: Balanced; Propofol   Emergence: Procedure Site   Logistics: Same Day Surgery     Postop Pain/Sedation Strategy:  Standard-Options: Opioids PRN     PONV Management:  Adult Risk Factors: Female, H/o PONV or Motion Sickness, Non-Smoker, Postop Opioids  Prevention: Propofol Infusion; Ondansetron; Dexamethasone     CONSENT: Direct conversation   Plan and risks discussed with: Patient                            Stone Fox MD  "

## 2019-01-25 LAB — COPATH REPORT: NORMAL

## 2019-01-26 NOTE — PROGRESS NOTES
Consult Notes on Referred Patient    Date: 2019     Dr. Pak referring provider defined for this encounter.     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 She is s/p CKC and colposcopy on 19 and is here today for follow up of pathology and treatment planning.      Brief History:  Cyndee presented for placement of an IUD originally and had a routine pap smear. That pap smear came back abnormal as ascus and subsequent colposcopy was done which revealed AIN and CIN1. She has an IUD in now. She otherwise is healthy - she has no history of heart disease, lung disease and diabetes. Recommend [facility] to check for skip lesions, other disease, would then recommend hysterectomy.  - 2 children, both vaginal deliveries.   - She works as an , lives in [place].     18: colposcopy  Final Diagnosis:  A. Cervix, 12:00, biopsy: adenocarcinoma in situ. Low grade squamous intraepithelial lesion (CIN1)   B. Endocervix, curettage: acute and chronic cervicitis.   ASCUS/HPV18 positive    19: biopsy of cervix 2:00  FINAL DIAGNOSIS:   CERVIX, 2 O'CLOCK, BIOPSY:   - Fragments of cervical mucosa with inflammatory infiltrate and benign   glands consistent with recent biopsy   changes   - Negative for dysplasia or malignancy     19: EUA, removal of IUD, colposcopy, CKC biopsy  Pathology:   A. CERVICAL CONE:   - High grade squamous intraepithelial lesion (cervical intraepithelial   neoplasia 2)   - Both ecto-and endocervical margins are negative for dysplasia   - Regenerative/reparative changes consistent with recent biopsy site   - No residual glandular neoplasia present     B. ENDOCERVICAL CURETTING:   - Endometrial fragments with inactive glands and decidualized stroma,   consistent with exogenous progesterone   effect   - Rare unremarkable endocervical  mucosal fragments      Today: Cyndee presents to clinic today feeling well. I explained that there was no residual disease found and pathology from [facility] revealed CIN2 with negative margins. She denies abdominal pain or pelvic pain. She is eating and drinking well. No urinary issues or bowel issues. She reports some vaginal spotting but she reports she has been bleeding since November. She denies nausea or vomiting.     Review of Systems:  Systemic           no weight changes; no fever; no chills; no night sweats; no appetite changes  Skin           no rashes, or lesions  Eye           no irritation; no changes in vision  Moira-Laryngeal           no dysphagia; no hoarseness   Pulmonary    no cough; no shortness of breath  Cardiovascular    no chest pain; no palpitations  Gastrointestinal    no diarrhea; no constipation; no abdominal pain; no changes in bowel  habits; no blood in stool  Genitourinary   no urinary frequency; no urinary urgency; no dysuria; no pain; no abnormal vaginal discharge; no abnormal vaginal bleeding  Breast   no breast discharge; no breast changes; no breast pain  Musculoskeletal    no myalgias; no arthralgias; no back pain  Psychiatric           no depressed mood; no anxiety    Hematologic           no tender lymph nodes; no noticeable swellings or lumps   Endocrine    no hot flashes; no heat/cold intolerance         Neurological   no tremor; no numbness and tingling; no headaches; no difficulty  sleeping    I have reviewed and addressed the patient's review of symptoms for today's visit.     Past Medical History:  - DARRICK  - seasonal allergies    Past Surgical History:  No past surgical history     Health Maintenance:  Health Maintenance Due   Topic Date Due     HIV SCREEN (SYSTEM ASSIGNED)  01/25/1989     PAP SCREENING Q3 YR (SYSTEM ASSIGNED)  01/25/1992     DTAP/TDAP/TD IMMUNIZATION (1 - Tdap) 01/25/1996     LIPID SCREEN Q5 YR FEMALE (SYSTEM ASSIGNED)  01/25/2016     INFLUENZA VACCINE (1)  "09/01/2018     Last Pap Smear: 11/2/18 Result: ASCUS    Last Mammogram: 7/20/18              Result: normal      She has not had a history of abnormal mammograms.    Last Colonoscopy: Not done                    Current Medications:   has a current medication list which includes the following prescription(s): ibuprofen.     Allergies:   Allergies   Allergen Reactions     Amoxicillin Rash      Social History:  Social History     Tobacco Use     Smoking status: Never Smoker     Smokeless tobacco: Never Used   Substance Use Topics     Alcohol use: Yes     Comment: occasional     History   Drug Use No     Family History:   The patient's family history is notable for:  No family history of cancer.     Physical Exam:   /90 (BP Location: Right arm)   Pulse 106   Temp 98  F (36.7  C) (Oral)   Resp 20   Ht 1.676 m (5' 5.98\")   Wt 71.3 kg (157 lb 4 oz)   SpO2 97%   BMI 25.39 kg/m    Body mass index is 25.39 kg/m .    General Appearance: healthy and alert, no distress      Psychiatric: appropriate mood and affect             Genitourinary: Deferred       Assessment:    Cyndee Combs is a 49 year old woman with a diagnosis of AIS of cervix and CIN2. S/p C.     Plan:    1.)   Due to previous diagnosis of adenocarcinoma in situ, I would still recommend we go ahead with DaVinci assisted TLH, bilateral salpingectomy, possible open abdomen ace she has completed childbearing. Cyndee is understanding of and in agreement with plan. She understands that this is the standard of care with AIS.  - Pre-op teaching was completed today.  Risks of surgery were discussed to include: bleeding, transfusion, infection, unintentional injury to surrounding organs/structures. Consent obtained from patient.  - Reviewed signs and symptoms for when she should contact the clinic or seek additional care. Patient to contact the clinic with any questions or concerns in the interim.  - Will schedule surgery for end of February, 6 weeks post " CKC. Instructed patient to have pre-op appointment 1-2 weeks prior to surgery.      2.) Genetic risk factors were assessed and the patient does not meet the qualifications for a referral.      3.) Labs and/or tests ordered include:  Pre-op exam with PCP 1-2 weeks prior to surgery.      4.) Health maintenance issues addressed today include: patient is UTD with pap and mammogram. Pt has not had colonoscopy.     5.) BP: 148/90, patient reports she usually has normal BP but she is very anxious today. Instructed to follow up with primary MD regarding this.    Thank you for allowing us to participate in the care of your patient.       Sincerely,    Iraida Nowrood MD    Department of Ob/Gyn and Women's Health  Division of Gynecologic Oncology  [facility]  [phone]    A total of 35 minutes of face to face time were spent with the patient with over 50% of that time spent in counseling, coordination of care, education, and symptom management.      CC  Patient Care Team:  Rosalinda Hickman NP as PCP - General    Varun HORTON, am serving as a scribe to document services personally performed by Iraida Norwood MD, based upon my observations and the provider's statements to me. All documentation has been reviewed by the aforementioned doctor prior to being entered into the official medical record.     I have reviewed the above note and agree with the scribe's notation as written.

## 2019-01-28 ENCOUNTER — ONCOLOGY VISIT (OUTPATIENT)
Dept: ONCOLOGY | Facility: CLINIC | Age: 49
End: 2019-01-28
Payer: COMMERCIAL

## 2019-01-28 VITALS
SYSTOLIC BLOOD PRESSURE: 148 MMHG | HEIGHT: 66 IN | BODY MASS INDEX: 25.27 KG/M2 | RESPIRATION RATE: 20 BRPM | DIASTOLIC BLOOD PRESSURE: 90 MMHG | TEMPERATURE: 98 F | OXYGEN SATURATION: 97 % | WEIGHT: 157.25 LBS | HEART RATE: 106 BPM

## 2019-01-28 DIAGNOSIS — D06.9 ADENOCARCINOMA IN SITU (AIS) OF UTERINE CERVIX: ICD-10-CM

## 2019-01-28 DIAGNOSIS — R87.810 CERVICAL HIGH RISK HPV (HUMAN PAPILLOMAVIRUS) TEST POSITIVE: ICD-10-CM

## 2019-01-28 DIAGNOSIS — R87.613 HIGH GRADE SQUAMOUS INTRAEPITHELIAL CERVICAL DYSPLASIA: Primary | ICD-10-CM

## 2019-01-28 PROCEDURE — 99214 OFFICE O/P EST MOD 30 MIN: CPT | Performed by: OBSTETRICS & GYNECOLOGY

## 2019-01-28 RX ORDER — CIPROFLOXACIN 2 MG/ML
400 INJECTION, SOLUTION INTRAVENOUS
Status: CANCELLED | OUTPATIENT
Start: 2019-01-28

## 2019-01-28 RX ORDER — PHENAZOPYRIDINE HYDROCHLORIDE 100 MG/1
200 TABLET, FILM COATED ORAL ONCE
Status: CANCELLED | OUTPATIENT
Start: 2019-01-28 | End: 2019-01-28

## 2019-01-28 RX ORDER — ACETAMINOPHEN 325 MG/1
975 TABLET ORAL ONCE
Status: CANCELLED | OUTPATIENT
Start: 2019-01-28 | End: 2019-01-28

## 2019-01-28 ASSESSMENT — MIFFLIN-ST. JEOR: SCORE: 1359.78

## 2019-01-28 ASSESSMENT — PAIN SCALES - GENERAL: PAINLEVEL: NO PAIN (0)

## 2019-01-28 NOTE — NURSING NOTE
"Oncology Rooming Note    January 28, 2019 12:38 PM   Cyndee Combs is a 49 year old female who presents for:    Chief Complaint   Patient presents with     Oncology Clinic Visit     Post op/Follow up     Initial Vitals: /90 (BP Location: Right arm)   Pulse 106   Temp 98  F (36.7  C) (Oral)   Resp 20   Ht 1.676 m (5' 5.98\")   Wt 71.3 kg (157 lb 4 oz)   SpO2 97%   BMI 25.39 kg/m   Estimated body mass index is 25.39 kg/m  as calculated from the following:    Height as of this encounter: 1.676 m (5' 5.98\").    Weight as of this encounter: 71.3 kg (157 lb 4 oz). Body surface area is 1.82 meters squared.  No Pain (0) Comment: Data Unavailable   No LMP recorded.  Allergies reviewed: Yes  Medications reviewed: Yes    Medications: Medication refills not needed today.  Pharmacy name entered into EPIC: Data Unavailable        5 minutes for nursing intake (face to face time)     Rebecca Jasso LPN            "

## 2019-01-28 NOTE — NURSING NOTE
Patient Education Note - [facility]    Relevant Diagnosis:  AIS    Teaching Topic:  DaVinci Total Laparoscopic Hysterectomy, bilateral, Salpingectomy, possible open abdomen    Person(s) involved in teaching:  Patient    Motivation Level:    Asks Questions:   Yes  Eager to Learn:  Yes  Cooperative:  Yes  Receptive (willing/able to accept information):  Yes  Comments:  None    Patient demonstrates understanding of the following:  Reason for the appointment, diagnosis and treatment plan:  Yes  Knowledge of proper use of medications and conditions for which they are ordered (with special attention to potential side effects or drug interactions):  Yes  Which situations necessitate calling provider and whom to contact:  Yes    Teaching Concerns:  No    Education/Instructional Materials Used/Given:     Before Your Surgery Booklet ([place])  Showering Before Surgery, CHG prep provided  Adult Pain Assessment Tool  Lymphedema: A Patient Resource Guide  Hysterectomy Guidelines   Home Care after Major Abdominal or Vaginal Surgery  [facility]  Accommodations Brochure   Phone numbers for [facility] and [address] Given to Patient    Tests ordered today: None    Post-op exam: 1-2 weeks after surgery, date is yet to be determined.    Pre-op exam: To be scheduled within 30 days prior to surgery date, patient is aware.  Provided patient with our clinic fax number to send pre-op H&P notes and any testing.    Time spent teaching with patient:  20 minutes    Surgical consent forms faxed to [facility] at fax number [phone].  Surgery scheduling staff at [facility] also notified.    Bharat Nelson, RN, BSN, OCN  Oncology Care Coordinator  [facility]

## 2019-01-28 NOTE — LETTER
2019         RE: Cyndee Combs  [address] [facility] [address]        Dear Colleague,    Thank you for referring your patient, Cyndee Combs, to the [facility]. Please see a copy of my visit note below.                            Consult Notes on Referred Patient    Date: 2019     Dr. Pak referring provider defined for this encounter.     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 She is s/p CKC and colposcopy on 19 and is here today for follow up of pathology and treatment planning.      Brief History:  Cyndee presented for placement of an IUD originally and had a routine pap smear. That pap smear came back abnormal as ascus and subsequent colposcopy was done which revealed AIN and CIN1. She has an IUD in now. She otherwise is healthy - she has no history of heart disease, lung disease and diabetes. Recommend CKC to check for skip lesions, other disease, would then recommend hysterectomy.  - 2 children, both vaginal deliveries.   - She works as an , lives in [place].     18: colposcopy  Final Diagnosis:  A. Cervix, 12:00, biopsy: adenocarcinoma in situ. Low grade squamous intraepithelial lesion (CIN1)   B. Endocervix, curettage: acute and chronic cervicitis.   ASCUS/HPV18 positive    19: biopsy of cervix 2:00  FINAL DIAGNOSIS:   CERVIX, 2 O'CLOCK, BIOPSY:   - Fragments of cervical mucosa with inflammatory infiltrate and benign   glands consistent with recent biopsy   changes   - Negative for dysplasia or malignancy     19: EUA, removal of IUD, colposcopy, CKC biopsy  Pathology:   A. CERVICAL CONE:   - High grade squamous intraepithelial lesion (cervical intraepithelial   neoplasia 2)   - Both ecto-and endocervical margins are negative for dysplasia   - Regenerative/reparative changes consistent with recent  biopsy site   - No residual glandular neoplasia present     B. ENDOCERVICAL CURETTING:   - Endometrial fragments with inactive glands and decidualized stroma,   consistent with exogenous progesterone   effect   - Rare unremarkable endocervical mucosal fragments      Today: Cyndee presents to clinic today feeling well. I explained that there was no residual disease found and pathology from [facility] revealed CIN2 with negative margins. She denies abdominal pain or pelvic pain. She is eating and drinking well. No urinary issues or bowel issues. She reports some vaginal spotting but she reports she has been bleeding since November. She denies nausea or vomiting.     Review of Systems:  Systemic           no weight changes; no fever; no chills; no night sweats; no appetite changes  Skin           no rashes, or lesions  Eye           no irritation; no changes in vision  Moira-Laryngeal           no dysphagia; no hoarseness   Pulmonary    no cough; no shortness of breath  Cardiovascular    no chest pain; no palpitations  Gastrointestinal    no diarrhea; no constipation; no abdominal pain; no changes in bowel  habits; no blood in stool  Genitourinary   no urinary frequency; no urinary urgency; no dysuria; no pain; no abnormal vaginal discharge; no abnormal vaginal bleeding  Breast   no breast discharge; no breast changes; no breast pain  Musculoskeletal    no myalgias; no arthralgias; no back pain  Psychiatric           no depressed mood; no anxiety    Hematologic           no tender lymph nodes; no noticeable swellings or lumps   Endocrine    no hot flashes; no heat/cold intolerance         Neurological   no tremor; no numbness and tingling; no headaches; no difficulty  sleeping    I have reviewed and addressed the patient's review of symptoms for today's visit.     Past Medical History:  - DARRICK  - seasonal allergies    Past Surgical History:  No past surgical history     Health Maintenance:  Health Maintenance Due   Topic Date  "Due     HIV SCREEN (SYSTEM ASSIGNED)  01/25/1989     PAP SCREENING Q3 YR (SYSTEM ASSIGNED)  01/25/1992     DTAP/TDAP/TD IMMUNIZATION (1 - Tdap) 01/25/1996     LIPID SCREEN Q5 YR FEMALE (SYSTEM ASSIGNED)  01/25/2016     INFLUENZA VACCINE (1) 09/01/2018     Last Pap Smear: 11/2/18 Result: ASCUS    Last Mammogram: 7/20/18              Result: normal      She has not had a history of abnormal mammograms.    Last Colonoscopy: Not done                    Current Medications:   has a current medication list which includes the following prescription(s): ibuprofen.     Allergies:   Allergies   Allergen Reactions     Amoxicillin Rash      Social History:  Social History     Tobacco Use     Smoking status: Never Smoker     Smokeless tobacco: Never Used   Substance Use Topics     Alcohol use: Yes     Comment: occasional     History   Drug Use No     Family History:   The patient's family history is notable for:  No family history of cancer.     Physical Exam:   /90 (BP Location: Right arm)   Pulse 106   Temp 98  F (36.7  C) (Oral)   Resp 20   Ht 1.676 m (5' 5.98\")   Wt 71.3 kg (157 lb 4 oz)   SpO2 97%   BMI 25.39 kg/m     Body mass index is 25.39 kg/m .    General Appearance: healthy and alert, no distress      Psychiatric: appropriate mood and affect             Genitourinary: Deferred       Assessment:    Cyndee Combs is a 49 year old woman with a diagnosis of AIS of cervix and CIN2. S/p C.     Plan:    1.)   Due to previous diagnosis of adenocarcinoma in situ, I would still recommend we go ahead with DaVinci assisted TLH, bilateral salpingectomy, possible open abdomen ace she has completed childbearing. Cyndee is understanding of and in agreement with plan. She understands that this is the standard of care with AIS.  - Pre-op teaching was completed today.  Risks of surgery were discussed to include: bleeding, transfusion, infection, unintentional injury to surrounding organs/structures. Consent obtained " from patient.  - Reviewed signs and symptoms for when she should contact the clinic or seek additional care. Patient to contact the clinic with any questions or concerns in the interim.  - Will schedule surgery for end of February, 6 weeks post CKC. Instructed patient to have pre-op appointment 1-2 weeks prior to surgery.      2.) Genetic risk factors were assessed and the patient does not meet the qualifications for a referral.      3.) Labs and/or tests ordered include:  Pre-op exam with PCP 1-2 weeks prior to surgery.      4.) Health maintenance issues addressed today include: patient is UTD with pap and mammogram. Pt has not had colonoscopy.     5.) BP: 148/90, patient reports she usually has normal BP but she is very anxious today. Instructed to follow up with primary MD regarding this.    Thank you for allowing us to participate in the care of your patient.       Sincerely,    Iraida Norwood MD    Department of Ob/Gyn and Women's Health  Division of Gynecologic Oncology  [facility]  [phone]    A total of 35 minutes of face to face time were spent with the patient with over 50% of that time spent in counseling, coordination of care, education, and symptom management.      CC  Patient Care Team:  Rosalinda Hickman NP as PCP - General    IVarun, am serving as a scribe to document services personally performed by Iraida Norwood MD, based upon my observations and the provider's statements to me. All documentation has been reviewed by the aforementioned doctor prior to being entered into the official medical record.     I have reviewed the above note and agree with the scribe's notation as written.      Again, thank you for allowing me to participate in the care of your patient.        Sincerely,        Iraida Norwood MD

## 2019-02-15 ENCOUNTER — HEALTH MAINTENANCE LETTER (OUTPATIENT)
Age: 49
End: 2019-02-15

## 2019-02-25 ENCOUNTER — CARE COORDINATION (OUTPATIENT)
Dept: ONCOLOGY | Facility: CLINIC | Age: 49
End: 2019-02-25

## 2019-02-25 NOTE — PROGRESS NOTES
Patient requested that her signed FMLA be emailed back to heart [email or web address]. Sent her an Authorization for electronic communication to sign and fax back. She states she is able to fax things out but can not receive faxes. Concetta Thompson RN

## 2019-02-26 NOTE — PROGRESS NOTES
2/26/19- received signed Authorization for Electronic Communication from patient for e-mailing copy of completed [place] paperwork to [email or web address]. Scanned into chart. E-mailed copy of [place] paperwork at 11:20am. Concetta Thompson RN

## 2019-03-05 ENCOUNTER — ANESTHESIA EVENT (OUTPATIENT)
Dept: SURGERY | Facility: CLINIC | Age: 49
End: 2019-03-05
Payer: COMMERCIAL

## 2019-03-05 RX ORDER — ALPRAZOLAM 0.25 MG
0.25 TABLET ORAL 3 TIMES DAILY PRN
COMMUNITY

## 2019-03-05 NOTE — ANESTHESIA PREPROCEDURE EVALUATION
Anesthesia Pre-Procedure Evaluation    Patient: Cyndee Combs   MRN:     [id number] Gender:   female   Age:    49 year old :      1970        Preoperative Diagnosis: High Grade Squamous Intraepithelial Cervical Dysplasia   Procedure(s):  DaVinci Assisted Total Laparoscopic Hysterectomy, Bilateral Salpingectomy  Possible Open     Past Medical History:   Diagnosis Date     Anxiety      PONV (postoperative nausea and vomiting)     Cone bx 3 weeks ago at [facility], had patch behind the ear and it was helpful.      Past Surgical History:   Procedure Laterality Date     COLPOSCOPY, CONIZATION, COMBINED N/A 2019    Procedure: Exam Under Anesthesia,Removal IUD, Colposcopy, Cold Knife Cone Biopsy, Currettings;  Surgeon: Iraida Norwood MD;  Location: [facility] OR          Anesthesia Evaluation     . Pt has had prior anesthetic.     History of anesthetic complications   - PONV        ROS/MED HX    ENT/Pulmonary:       Neurologic:       Cardiovascular:         METS/Exercise Tolerance:     Hematologic:         Musculoskeletal:         GI/Hepatic:         Renal/Genitourinary:         Endo:         Psychiatric:     (+) psychiatric history anxiety      Infectious Disease:         Malignancy:         Other:                         PHYSICAL EXAM:   Mental Status/Neuro: A/A/O   Airway: Facies: Feasible  Mallampati: I  Mouth/Opening: Full  TM distance: > 6 cm  Neck ROM: Full   Respiratory: Auscultation: CTAB     Resp. Rate: Normal     Resp. Effort: Normal      CV: Rhythm: Regular  Rate: Age appropriate  Heart: Normal Sounds   Comments:      Dental: Normal                  Lab Results   Component Value Date    WBC 8.3 2019    HGB 15.4 2019    HCT 45.4 2019     2019    HCGS Negative 2019       Preop Vitals  BP Readings from Last 3 Encounters:   19 148/90   19 132/86   19 (!) 151/92    Pulse Readings from Last 3 Encounters:   19 106   19 102   19 92     "  Resp Readings from Last 3 Encounters:   01/28/19 20   01/17/19 16   01/07/19 16    SpO2 Readings from Last 3 Encounters:   01/28/19 97%   01/17/19 97%   01/07/19 99%      Temp Readings from Last 1 Encounters:   01/28/19 36.7  C (98  F) (Oral)    Ht Readings from Last 1 Encounters:   01/28/19 1.676 m (5' 5.98\")      Wt Readings from Last 1 Encounters:   01/28/19 71.3 kg (157 lb 4 oz)    Estimated body mass index is 25.39 kg/m  as calculated from the following:    Height as of 1/28/19: 1.676 m (5' 5.98\").    Weight as of 1/28/19: 71.3 kg (157 lb 4 oz).     LDA:            Assessment:   ASA SCORE: 1    NPO Status: > 2 hours since completed Clear Liquids; > 6 hours since completed Solid Foods   Documentation: H&P complete; Preop Testing complete; Consents complete   Proceeding: Proceed without further delay  Tobacco Use:  NO Active use of Tobacco/UNKNOWN Tobacco use status     Plan:   Anes. Type:  General      Induction:  IV (Standard)   Airway: Oral ETT   Access/Monitoring: PIV; 2nd PIV   Maintenance: Balanced   Emergence: Procedure Site   Logistics: Same Day Surgery     Postop Pain/Sedation Strategy:  Standard-Options: Opioids PRN     PONV Management:  Adult Risk Factors: Female, H/o PONV or Motion Sickness, Non-Smoker, Postop Opioids  Prevention: Scop. Patch; Ondansetron     CONSENT: Direct conversation   Plan and risks discussed with: Patient; Spouse   Blood Products: Consented (ALL Blood Products)                         Ying Condon MD  "

## 2019-03-06 ENCOUNTER — SURGERY (OUTPATIENT)
Age: 49
End: 2019-03-06
Payer: COMMERCIAL

## 2019-03-06 ENCOUNTER — ANESTHESIA (OUTPATIENT)
Dept: SURGERY | Facility: CLINIC | Age: 49
End: 2019-03-06
Payer: COMMERCIAL

## 2019-03-06 ENCOUNTER — HOSPITAL ENCOUNTER (OUTPATIENT)
Facility: CLINIC | Age: 49
Discharge: HOME | End: 2019-03-06
Attending: OBSTETRICS & GYNECOLOGY | Admitting: OBSTETRICS & GYNECOLOGY
Payer: COMMERCIAL

## 2019-03-06 VITALS
BODY MASS INDEX: 24.94 KG/M2 | HEIGHT: 66 IN | DIASTOLIC BLOOD PRESSURE: 84 MMHG | TEMPERATURE: 98.4 F | SYSTOLIC BLOOD PRESSURE: 133 MMHG | WEIGHT: 155.2 LBS | OXYGEN SATURATION: 100 % | HEART RATE: 107 BPM | RESPIRATION RATE: 16 BRPM

## 2019-03-06 DIAGNOSIS — R87.613 HIGH GRADE SQUAMOUS INTRAEPITHELIAL CERVICAL DYSPLASIA: ICD-10-CM

## 2019-03-06 DIAGNOSIS — D06.9 ADENOCARCINOMA IN SITU (AIS) OF UTERINE CERVIX: ICD-10-CM

## 2019-03-06 DIAGNOSIS — Z90.710 STATUS POST HYSTERECTOMY: Primary | ICD-10-CM

## 2019-03-06 DIAGNOSIS — R87.810 CERVICAL HIGH RISK HPV (HUMAN PAPILLOMAVIRUS) TEST POSITIVE: ICD-10-CM

## 2019-03-06 LAB
ABO + RH BLD: NORMAL
ABO + RH BLD: NORMAL
BLD GP AB SCN SERPL QL: NORMAL
BLOOD BANK CMNT PATIENT-IMP: NORMAL
GLUCOSE BLDC GLUCOMTR-MCNC: 100 MG/DL (ref 70–99)
HCG UR QL: NEGATIVE
SPECIMEN EXP DATE BLD: NORMAL

## 2019-03-06 PROCEDURE — 25000132 ZZH RX MED GY IP 250 OP 250 PS 637: Performed by: STUDENT IN AN ORGANIZED HEALTH CARE EDUCATION/TRAINING PROGRAM

## 2019-03-06 PROCEDURE — 25000128 H RX IP 250 OP 636: Performed by: OBSTETRICS & GYNECOLOGY

## 2019-03-06 PROCEDURE — 71000015 ZZH RECOVERY PHASE 1 LEVEL 2 EA ADDTL HR: Performed by: OBSTETRICS & GYNECOLOGY

## 2019-03-06 PROCEDURE — 88309 TISSUE EXAM BY PATHOLOGIST: CPT | Performed by: OBSTETRICS & GYNECOLOGY

## 2019-03-06 PROCEDURE — 25000128 H RX IP 250 OP 636: Performed by: STUDENT IN AN ORGANIZED HEALTH CARE EDUCATION/TRAINING PROGRAM

## 2019-03-06 PROCEDURE — 36000088 ZZH SURGERY LEVEL 8 EA 15 ADDTL MIN - UMMC: Performed by: OBSTETRICS & GYNECOLOGY

## 2019-03-06 PROCEDURE — 40000171 ZZH STATISTIC PRE-PROCEDURE ASSESSMENT III: Performed by: OBSTETRICS & GYNECOLOGY

## 2019-03-06 PROCEDURE — 82962 GLUCOSE BLOOD TEST: CPT

## 2019-03-06 PROCEDURE — 36000086 ZZH SURGERY LEVEL 8 1ST 30 MIN UMMC: Performed by: OBSTETRICS & GYNECOLOGY

## 2019-03-06 PROCEDURE — 58571 TLH W/T/O 250 G OR LESS: CPT | Mod: GC | Performed by: OBSTETRICS & GYNECOLOGY

## 2019-03-06 PROCEDURE — 27210794 ZZH OR GENERAL SUPPLY STERILE: Performed by: OBSTETRICS & GYNECOLOGY

## 2019-03-06 PROCEDURE — 25000566 ZZH SEVOFLURANE, EA 15 MIN: Performed by: OBSTETRICS & GYNECOLOGY

## 2019-03-06 PROCEDURE — 71000027 ZZH RECOVERY PHASE 2 EACH 15 MINS: Performed by: OBSTETRICS & GYNECOLOGY

## 2019-03-06 PROCEDURE — 86901 BLOOD TYPING SEROLOGIC RH(D): CPT | Performed by: STUDENT IN AN ORGANIZED HEALTH CARE EDUCATION/TRAINING PROGRAM

## 2019-03-06 PROCEDURE — 36415 COLL VENOUS BLD VENIPUNCTURE: CPT | Performed by: STUDENT IN AN ORGANIZED HEALTH CARE EDUCATION/TRAINING PROGRAM

## 2019-03-06 PROCEDURE — 25000128 H RX IP 250 OP 636: Performed by: ANESTHESIOLOGY

## 2019-03-06 PROCEDURE — 25800030 ZZH RX IP 258 OP 636: Performed by: NURSE ANESTHETIST, CERTIFIED REGISTERED

## 2019-03-06 PROCEDURE — 86850 RBC ANTIBODY SCREEN: CPT | Performed by: STUDENT IN AN ORGANIZED HEALTH CARE EDUCATION/TRAINING PROGRAM

## 2019-03-06 PROCEDURE — 86900 BLOOD TYPING SEROLOGIC ABO: CPT | Performed by: STUDENT IN AN ORGANIZED HEALTH CARE EDUCATION/TRAINING PROGRAM

## 2019-03-06 PROCEDURE — 71000014 ZZH RECOVERY PHASE 1 LEVEL 2 FIRST HR: Performed by: OBSTETRICS & GYNECOLOGY

## 2019-03-06 PROCEDURE — 25000128 H RX IP 250 OP 636: Performed by: NURSE ANESTHETIST, CERTIFIED REGISTERED

## 2019-03-06 PROCEDURE — 25000125 ZZHC RX 250: Performed by: NURSE ANESTHETIST, CERTIFIED REGISTERED

## 2019-03-06 PROCEDURE — 25000125 ZZHC RX 250: Performed by: ANESTHESIOLOGY

## 2019-03-06 PROCEDURE — 37000009 ZZH ANESTHESIA TECHNICAL FEE, EACH ADDTL 15 MIN: Performed by: OBSTETRICS & GYNECOLOGY

## 2019-03-06 PROCEDURE — 25000132 ZZH RX MED GY IP 250 OP 250 PS 637: Performed by: OBSTETRICS & GYNECOLOGY

## 2019-03-06 PROCEDURE — 81025 URINE PREGNANCY TEST: CPT | Performed by: OBSTETRICS & GYNECOLOGY

## 2019-03-06 PROCEDURE — 37000008 ZZH ANESTHESIA TECHNICAL FEE, 1ST 30 MIN: Performed by: OBSTETRICS & GYNECOLOGY

## 2019-03-06 PROCEDURE — C9290 INJ, BUPIVACAINE LIPOSOME: HCPCS | Performed by: STUDENT IN AN ORGANIZED HEALTH CARE EDUCATION/TRAINING PROGRAM

## 2019-03-06 RX ORDER — AMOXICILLIN 250 MG
1-2 CAPSULE ORAL 2 TIMES DAILY
Qty: 60 TABLET | Refills: 0 | Status: SHIPPED | OUTPATIENT
Start: 2019-03-06

## 2019-03-06 RX ORDER — SODIUM CHLORIDE, SODIUM LACTATE, POTASSIUM CHLORIDE, CALCIUM CHLORIDE 600; 310; 30; 20 MG/100ML; MG/100ML; MG/100ML; MG/100ML
INJECTION, SOLUTION INTRAVENOUS CONTINUOUS
Status: DISCONTINUED | OUTPATIENT
Start: 2019-03-06 | End: 2019-03-06 | Stop reason: HOSPADM

## 2019-03-06 RX ORDER — NALOXONE HYDROCHLORIDE 0.4 MG/ML
.1-.4 INJECTION, SOLUTION INTRAMUSCULAR; INTRAVENOUS; SUBCUTANEOUS
Status: DISCONTINUED | OUTPATIENT
Start: 2019-03-06 | End: 2019-03-06 | Stop reason: HOSPADM

## 2019-03-06 RX ORDER — PROPOFOL 10 MG/ML
INJECTION, EMULSION INTRAVENOUS CONTINUOUS PRN
Status: DISCONTINUED | OUTPATIENT
Start: 2019-03-06 | End: 2019-03-06

## 2019-03-06 RX ORDER — FLUMAZENIL 0.1 MG/ML
0.2 INJECTION, SOLUTION INTRAVENOUS
Status: DISCONTINUED | OUTPATIENT
Start: 2019-03-06 | End: 2019-03-06 | Stop reason: HOSPADM

## 2019-03-06 RX ORDER — SODIUM CHLORIDE, SODIUM LACTATE, POTASSIUM CHLORIDE, CALCIUM CHLORIDE 600; 310; 30; 20 MG/100ML; MG/100ML; MG/100ML; MG/100ML
INJECTION, SOLUTION INTRAVENOUS CONTINUOUS PRN
Status: DISCONTINUED | OUTPATIENT
Start: 2019-03-06 | End: 2019-03-06

## 2019-03-06 RX ORDER — SCOLOPAMINE TRANSDERMAL SYSTEM 1 MG/1
1 PATCH, EXTENDED RELEASE TRANSDERMAL
Status: COMPLETED | OUTPATIENT
Start: 2019-03-06 | End: 2019-03-06

## 2019-03-06 RX ORDER — BUPIVACAINE HYDROCHLORIDE 2.5 MG/ML
INJECTION, SOLUTION EPIDURAL; INFILTRATION; INTRACAUDAL PRN
Status: DISCONTINUED | OUTPATIENT
Start: 2019-03-06 | End: 2019-03-06

## 2019-03-06 RX ORDER — ONDANSETRON 4 MG/1
4 TABLET, ORALLY DISINTEGRATING ORAL EVERY 30 MIN PRN
Status: DISCONTINUED | OUTPATIENT
Start: 2019-03-06 | End: 2019-03-06 | Stop reason: HOSPADM

## 2019-03-06 RX ORDER — PROPOFOL 10 MG/ML
INJECTION, EMULSION INTRAVENOUS PRN
Status: DISCONTINUED | OUTPATIENT
Start: 2019-03-06 | End: 2019-03-06

## 2019-03-06 RX ORDER — ACETAMINOPHEN 325 MG/1
975 TABLET ORAL ONCE
Status: COMPLETED | OUTPATIENT
Start: 2019-03-06 | End: 2019-03-06

## 2019-03-06 RX ORDER — FENTANYL CITRATE 50 UG/ML
25-50 INJECTION, SOLUTION INTRAMUSCULAR; INTRAVENOUS
Status: DISCONTINUED | OUTPATIENT
Start: 2019-03-06 | End: 2019-03-06 | Stop reason: HOSPADM

## 2019-03-06 RX ORDER — CIPROFLOXACIN 2 MG/ML
400 INJECTION, SOLUTION INTRAVENOUS
Status: COMPLETED | OUTPATIENT
Start: 2019-03-06 | End: 2019-03-06

## 2019-03-06 RX ORDER — ONDANSETRON 2 MG/ML
4 INJECTION INTRAMUSCULAR; INTRAVENOUS EVERY 30 MIN PRN
Status: DISCONTINUED | OUTPATIENT
Start: 2019-03-06 | End: 2019-03-06 | Stop reason: HOSPADM

## 2019-03-06 RX ORDER — FENTANYL CITRATE 50 UG/ML
INJECTION, SOLUTION INTRAMUSCULAR; INTRAVENOUS PRN
Status: DISCONTINUED | OUTPATIENT
Start: 2019-03-06 | End: 2019-03-06

## 2019-03-06 RX ORDER — HYDROMORPHONE HYDROCHLORIDE 1 MG/ML
.3-.5 INJECTION, SOLUTION INTRAMUSCULAR; INTRAVENOUS; SUBCUTANEOUS EVERY 5 MIN PRN
Status: DISCONTINUED | OUTPATIENT
Start: 2019-03-06 | End: 2019-03-06 | Stop reason: HOSPADM

## 2019-03-06 RX ORDER — DEXAMETHASONE SODIUM PHOSPHATE 4 MG/ML
INJECTION, SOLUTION INTRA-ARTICULAR; INTRALESIONAL; INTRAMUSCULAR; INTRAVENOUS; SOFT TISSUE PRN
Status: DISCONTINUED | OUTPATIENT
Start: 2019-03-06 | End: 2019-03-06

## 2019-03-06 RX ORDER — PHENAZOPYRIDINE HYDROCHLORIDE 200 MG/1
200 TABLET, FILM COATED ORAL ONCE
Status: COMPLETED | OUTPATIENT
Start: 2019-03-06 | End: 2019-03-06

## 2019-03-06 RX ORDER — OXYCODONE HYDROCHLORIDE 5 MG/1
5 TABLET ORAL
Status: COMPLETED | OUTPATIENT
Start: 2019-03-06 | End: 2019-03-06

## 2019-03-06 RX ORDER — ACETAMINOPHEN 325 MG/1
650 TABLET ORAL EVERY 4 HOURS PRN
Qty: 50 TABLET | Refills: 0 | Status: SHIPPED | OUTPATIENT
Start: 2019-03-06

## 2019-03-06 RX ORDER — IBUPROFEN 600 MG/1
600 TABLET, FILM COATED ORAL EVERY 6 HOURS PRN
Qty: 30 TABLET | Refills: 0 | Status: SHIPPED | OUTPATIENT
Start: 2019-03-06

## 2019-03-06 RX ORDER — OXYCODONE HYDROCHLORIDE 5 MG/1
5 TABLET ORAL EVERY 6 HOURS PRN
Qty: 15 TABLET | Refills: 0 | Status: SHIPPED | OUTPATIENT
Start: 2019-03-06 | End: 2019-03-14

## 2019-03-06 RX ORDER — ACETAMINOPHEN 325 MG/1
650 TABLET ORAL
Status: DISCONTINUED | OUTPATIENT
Start: 2019-03-06 | End: 2019-03-06 | Stop reason: HOSPADM

## 2019-03-06 RX ORDER — ONDANSETRON 2 MG/ML
INJECTION INTRAMUSCULAR; INTRAVENOUS PRN
Status: DISCONTINUED | OUTPATIENT
Start: 2019-03-06 | End: 2019-03-06

## 2019-03-06 RX ORDER — LIDOCAINE HYDROCHLORIDE 20 MG/ML
INJECTION, SOLUTION INFILTRATION; PERINEURAL PRN
Status: DISCONTINUED | OUTPATIENT
Start: 2019-03-06 | End: 2019-03-06

## 2019-03-06 RX ADMIN — LIDOCAINE HYDROCHLORIDE 80 MG: 20 INJECTION, SOLUTION INFILTRATION; PERINEURAL at 07:36

## 2019-03-06 RX ADMIN — SUGAMMADEX 140 MG: 100 INJECTION, SOLUTION INTRAVENOUS at 09:46

## 2019-03-06 RX ADMIN — FENTANYL CITRATE 50 MCG: 50 INJECTION, SOLUTION INTRAMUSCULAR; INTRAVENOUS at 09:29

## 2019-03-06 RX ADMIN — ROCURONIUM BROMIDE 50 MG: 10 INJECTION INTRAVENOUS at 07:36

## 2019-03-06 RX ADMIN — Medication 0.3 MG: at 10:30

## 2019-03-06 RX ADMIN — Medication 0.4 MG: at 10:42

## 2019-03-06 RX ADMIN — FENTANYL CITRATE 25 MCG: 50 INJECTION, SOLUTION INTRAMUSCULAR; INTRAVENOUS at 10:26

## 2019-03-06 RX ADMIN — PROPOFOL 25 MCG/KG/MIN: 10 INJECTION, EMULSION INTRAVENOUS at 08:10

## 2019-03-06 RX ADMIN — FENTANYL CITRATE 50 MCG: 50 INJECTION, SOLUTION INTRAMUSCULAR; INTRAVENOUS at 07:16

## 2019-03-06 RX ADMIN — SCOPOLAMINE 1 PATCH: 1 PATCH, EXTENDED RELEASE TRANSDERMAL at 07:08

## 2019-03-06 RX ADMIN — FENTANYL CITRATE 50 MCG: 50 INJECTION, SOLUTION INTRAMUSCULAR; INTRAVENOUS at 10:35

## 2019-03-06 RX ADMIN — METRONIDAZOLE 500 MG: 500 INJECTION, SOLUTION INTRAVENOUS at 07:50

## 2019-03-06 RX ADMIN — FENTANYL CITRATE 25 MCG: 50 INJECTION, SOLUTION INTRAMUSCULAR; INTRAVENOUS at 10:21

## 2019-03-06 RX ADMIN — BUPIVACAINE 20 ML: 13.3 INJECTION, SUSPENSION, LIPOSOMAL INFILTRATION at 07:15

## 2019-03-06 RX ADMIN — BUPIVACAINE HYDROCHLORIDE 20 ML: 2.5 INJECTION, SOLUTION EPIDURAL; INFILTRATION; INTRACAUDAL; PERINEURAL at 07:15

## 2019-03-06 RX ADMIN — ACETAMINOPHEN 975 MG: 325 TABLET, FILM COATED ORAL at 06:39

## 2019-03-06 RX ADMIN — ONDANSETRON 4 MG: 2 INJECTION INTRAMUSCULAR; INTRAVENOUS at 09:37

## 2019-03-06 RX ADMIN — DEXAMETHASONE SODIUM PHOSPHATE 6 MG: 4 INJECTION, SOLUTION INTRA-ARTICULAR; INTRALESIONAL; INTRAMUSCULAR; INTRAVENOUS; SOFT TISSUE at 08:15

## 2019-03-06 RX ADMIN — PROPOFOL 160 MG: 10 INJECTION, EMULSION INTRAVENOUS at 07:36

## 2019-03-06 RX ADMIN — CIPROFLOXACIN 400 MG: 2 INJECTION, SOLUTION INTRAVENOUS at 07:16

## 2019-03-06 RX ADMIN — MIDAZOLAM 1 MG: 1 INJECTION INTRAMUSCULAR; INTRAVENOUS at 07:24

## 2019-03-06 RX ADMIN — MIDAZOLAM HYDROCHLORIDE 1 MG: 2 INJECTION, SOLUTION INTRAMUSCULAR; INTRAVENOUS at 07:16

## 2019-03-06 RX ADMIN — FENTANYL CITRATE 100 MCG: 50 INJECTION, SOLUTION INTRAMUSCULAR; INTRAVENOUS at 07:36

## 2019-03-06 RX ADMIN — ROCURONIUM BROMIDE 10 MG: 10 INJECTION INTRAVENOUS at 08:52

## 2019-03-06 RX ADMIN — SODIUM CHLORIDE, POTASSIUM CHLORIDE, SODIUM LACTATE AND CALCIUM CHLORIDE: 600; 310; 30; 20 INJECTION, SOLUTION INTRAVENOUS at 07:24

## 2019-03-06 RX ADMIN — FENTANYL CITRATE 25 MCG: 50 INJECTION, SOLUTION INTRAMUSCULAR; INTRAVENOUS at 10:23

## 2019-03-06 RX ADMIN — OXYCODONE HYDROCHLORIDE 5 MG: 5 TABLET ORAL at 11:04

## 2019-03-06 RX ADMIN — PHENAZOPYRIDINE HYDROCHLORIDE 200 MG: 200 TABLET, FILM COATED ORAL at 06:40

## 2019-03-06 RX ADMIN — MIDAZOLAM 1 MG: 1 INJECTION INTRAMUSCULAR; INTRAVENOUS at 07:29

## 2019-03-06 ASSESSMENT — MIFFLIN-ST. JEOR: SCORE: 1350.75

## 2019-03-06 NOTE — ANESTHESIA POSTPROCEDURE EVALUATION
Anesthesia POST Procedure Evaluation    Patient: Cyndee Combs   MRN:     [id number] Gender:   female   Age:    49 year old :      1970        Preoperative Diagnosis: High Grade Squamous Intraepithelial Cervical Dysplasia   Procedure(s):  DaVinci Assisted Total Laparoscopic Hysterectomy, Bilateral Salpingectomy  Possible Open  Cystoscopy   Postop Comments: No value filed.       Anesthesia Type:  General    Reportable Event:      PAIN: Uncomplicated   Sign Out status: Comfortable, Well controlled pain     PONV: No PONV   Sign Out status:  No Nausea or Vomiting     Neuro/Psych: Uneventful perioperative course   Sign Out Status: Preoperative baseline; Age appropriate mentation     Airway/Resp.: Uneventful perioperative course   Sign Out Status: Non labored breathing, age appropriate RR; Resp. Status within EXPECTED Parameters     CV: Uneventful perioperative course   Sign Out status: Appropriate BP and perfusion indices; Appropriate HR/Rhythm     Disposition:   Sign Out in:  PACU  Disposition:  Phase II; Home  Recovery Course: Uneventful  Follow-Up: Not required           Last Anesthesia Record Vitals:  CRNA VITALS  3/6/2019 0923 - 3/6/2019 1014      3/6/2019             SpO2:  100 %          Last PACU/Preop Vitals:  Vitals:    19 0556 19 0717   BP: (!) 138/97 143/86   Pulse:  102   Resp: 16 (!) 31   Temp: 36.8  C (98.3  F)    SpO2: 100% 100%         Electronically Signed By: Ying Condon MD, 2019, 10:14 AM

## 2019-03-06 NOTE — DISCHARGE INSTRUCTIONS
Same-Day Surgery   Adult Discharge Orders & Instructions     For 24 hours after surgery:  1. Get plenty of rest.  A responsible adult must stay with you for at least 24 hours after you leave the hospital.   2. Pain medication can slow your reflexes. Do not drive or use heavy equipment.  If you have weakness or tingling, don't drive or use heavy equipment until this feeling goes away.  3. Mixing alcohol and pain medication can cause dizziness and slow your breathing. It can even be fatal. Do not drink alcohol while taking pain medication.  4. Avoid strenuous or risky activities.  Ask for help when climbing stairs.   5. You may feel lightheaded.  If so, sit for a few minutes before standing.  Have someone help you get up.   6. If you have nausea (feel sick to your stomach), drink only clear liquids such as apple juice, ginger ale, broth or 7-Up.  Rest may also help.  Be sure to drink enough fluids.  Move to a regular diet as you feel able. Take pain medications with a small amount of solid food, such as toast or crackers, to avoid nausea.   7. A slight fever is normal. Call the doctor if your fever is over 100 F (37.7 C) (taken under the tongue) or lasts longer than 24 hours.  8. You may have a dry mouth, muscle aches, trouble sleeping or a sore throat.  These symptoms should go away after 24 hours.  9. Do not make important or legal decisions.   Pain Management:      1. Take pain medication (if prescribed) for pain as directed by your physician.        2. WARNING: If the pain medication you have been prescribed contains Tylenol  (acetaminophen), DO NOT take additional doses of Tylenol (acetaminophen).     Call your doctor for any of the followin.  Signs of infection (fever, growing tenderness at the surgery site, severe pain, a large amount of drainage or bleeding, foul-smelling drainage, redness, swelling).    2.  It has been over 8 to 10 hours since surgery and you are still not able to urinate (pee).    3.   Headache for over 24 hours.      To contact a doctor, call _Dr Norwood's office at [phone]-- Gynecology-Oncology__or:      [phone] and ask for the Resident On Call for:          ______Gyn-onc_________ (answered 24 hours a day)      Emergency Department:  [place] [facility]: [phone]  [place] [facility]: [phone]               Rev. 10/2014

## 2019-03-06 NOTE — PROGRESS NOTES
Gynecologic Oncology Postoperative Check Note  3/6/2019    S: Patient doing well post-operatively. Her pain is well managed. She is voiding spontaneously without issue. She has tolerated small amount of PO, denies nausea or vomiting. No shortness of breath, chest pain, dizziness, or other systemic concerns. No dizziness with ambulation. Had some lightheadedness that resolved.     O:  Vitals:    03/06/19 1230 03/06/19 1300 03/06/19 1330 03/06/19 1353   BP:  (!) 134/95 147/86 133/84   BP Location:       Pulse:   107    Resp: 16 16 16 16   Temp:    98.4  F (36.9  C)   TempSrc:    Oral   SpO2: 97% 99% 99% 100%   Weight:       Height:       O2 RA    Gen: Awake, alert, no acute distress  Cardio: Regular rate.  Resp: Clear to ascultation anterior  Abdomen: Soft, nondistended  Incision: Lap sites CDI dressings in place  Extremities: No edema    A/P: 49 year old POD#0 s/p RA-TLH/BS and cysto for cervical dysplasia. Reviewed post op instructions and when to call the gyn onc clinic. Discussed pain control with tylenol and ibuprofen and only take narcotics if needed. Discussed use of laxatives post op to assist with and prevent constipation. Patient feels ready for discharge. She will follow up in clinic with Dr Norwood as scheduled or sooner if needed.      Mary Fleming CNP  3/6/2019 2:25 PM

## 2019-03-06 NOTE — ADDENDUM NOTE
Addendum  created 03/06/19 1137 by Vaishali Flores APRN CRNA    Intraprocedure Flowsheets edited, Intraprocedure LDAs edited, LDA properties accepted

## 2019-03-06 NOTE — BRIEF OP NOTE
[facility], [place]    Brief Operative Note    Pre-operative diagnosis: High Grade Squamous Intraepithelial Cervical Dysplasia  Post-operative diagnosis Same  Procedure: Procedure(s):  DaVinci Assisted Total Laparoscopic Hysterectomy, Bilateral Salpingectomy  Possible Open  Cystoscopy  Surgeon: Surgeon(s) and Role:  Panel 1:     * Iraida Norwood MD - Primary     * Oxana Sommer MD - Resident - Assisting  Panel 2:     * Iraida Norwood MD - Primary  Anesthesia: Combined General with Block   Estimated blood loss: 50cc  Drains: None  Specimens:   ID Type Source Tests Collected by Time Destination   A : uterus cervix and bilateral fallopian tubes to open Organ Uterus, Cervix and Bilateral Fallopian Tubes SURGICAL PATHOLOGY EXAM Iraida Norwood MD 3/6/2019  9:02 AM      Findings:  Small, mobile uterus. Anteriorly almost flush with vagina on bimanual. On lsc, no evidence of injury on entry. Normal appearing tubes, ovaries and uterus. Normal survey of upper abdomen and peritoneal surfaces. Bilateral ureters visualized transperitoneally. Hemostasis at end of case. Cystoscopy revealed brisk efflux from both ureteral orifices.   Complications: None.  Implants: None.    Oxana Sommer PGY-3

## 2019-03-06 NOTE — ANESTHESIA CARE TRANSFER NOTE
Patient: Cyndee Combs    Procedure(s):  DaVinci Assisted Total Laparoscopic Hysterectomy, Bilateral Salpingectomy  Possible Open  Cystoscopy    Diagnosis: High Grade Squamous Intraepithelial Cervical Dysplasia  Diagnosis Additional Information: No value filed.    Anesthesia Type:   No value filed.     Note:  Airway :Face Mask  Patient transferred to:PACU  Comments:  VSS on arrival, report to RN.Handoff Report: Identifed the Patient, Identified the Reponsible Provider, Reviewed the pertinent medical history, Discussed the surgical course, Reviewed Intra-OP anesthesia mangement and issues during anesthesia, Set expectations for post-procedure period and Allowed opportunity for questions and acknowledgement of understanding      Vitals: (Last set prior to Anesthesia Care Transfer)    CRNA VITALS  3/6/2019 0923 - 3/6/2019 0958      3/6/2019             SpO2:  100 %                Electronically Signed By: MAX Armstrong CRNA  March 6, 2019  9:58 AM

## 2019-03-06 NOTE — ANESTHESIA PROCEDURE NOTES
Peripheral Nerve Block Procedure Note    Staff:     Anesthesiologist:  Dutch Vanessa MD    Resident/CRNA:  Tika Frank MD    Block performed by resident/CRNA in the presence of a teaching physician    Location: Pre-op  Procedure Start/Stop TImes:      3/6/2019 7:10 AM     3/6/2019 7:20 AM    patient identified, IV checked, site marked, risks and benefits discussed, informed consent, monitors and equipment checked, pre-op evaluation, at physician/surgeon's request and post-op pain management      Correct Patient: Yes      Correct Position: Yes      Correct Site: Yes      Correct Procedure: Yes      Correct Laterality:  Yes    Site Marked:  Yes  Procedure details:     Procedure:  TAP    ASA:  1    Laterality:  Bilateral    Position:  Supine    Sterile Prep: chloraprep, mask and sterile gloves      Needle:  Short bevel    Needle gauge:  21    Needle length (inches):  3.13    Ultrasound: Yes      Ultrasound used to identify targeted nerve, plexus, or vascular structure and placed a needle adjacent to it      Permanent Image entered into patiient's record      Abnormal pain on injection: No      Blood Aspirated: No      Paresthesias:  No    Bleeding at site: No      Bolus via:  Needle    Infusion Method:  Single Shot    Complications:  None  Assessment/Narrative:     Injection made incrementally with aspirations every (mL):  5

## 2019-03-07 ENCOUNTER — TELEPHONE (OUTPATIENT)
Dept: ONCOLOGY | Facility: CLINIC | Age: 49
End: 2019-03-07

## 2019-03-07 NOTE — OP NOTE
GYNECOLOGIC ONCOLOGY OPERATION NOTE    PATIENT: Cyndee Combs  MRN: [id number]  DATE OF SURGERY: 03/06/19    PREOPERATIVE DIAGNOSES:   Cervical dysplasia     POSTOPERATIVE DIAGNOSES:   Same    PROCEDURES:   Robotic assisted total laparoscopic hysterectomy, bilateral salpingectomy, cystoscopy      SURGEON: Iraida Norwood MD     ASSISTANTS:   1. Oxana Sommer MD, PGY-3     ANESTHESIA: General endotracheal.     ESTIMATED BLOOD LOSS: 30 ml     IV FLUIDS:  ml    URINE OUTPUT: 250 ml    DRAINS: none      FINDINGS: Small, mobile uterus. Anteriorly almost flush with vagina on bimanual. On lsc, no evidence of injury on entry. Normal appearing tubes, ovaries and uterus. Normal survey of upper abdomen and peritoneal surfaces. Bilateral ureters visualized transperitoneally. Hemostasis at end of case. Cystoscopy revealed brisk efflux from both ureteral orifices.     SPECIMENS:   Uterus, cervix and fallopian tubes    COMPLICATIONS: None.     CONDITION: Stable to PACU.     INDICATIONS: Cyndee Combs is a 49 year old who had a history of AIS in 2018 and JESSI 2 on vone biopsy. Given hx of AIS, hysterectomy was recommended.     OPERATIVE PROCEDURE IN DETAIL: Consent was reviewed with the patient in the preoperative setting and confirmed. She received prophylactic antibiotics. In addition, she received heparin for venous thrombosis prevention. The patient was transferred to the operating room and placed in dorsal supine position. General anesthetic was obtained in the usual manner without noted difficulties. The patient was then positioned onto Jaret stirrups and an exam under anesthesia was performed with findings as described above. De Souza catheter was then placed under sterile techniques and the patient was prepped and draped for the above-mentioned procedure.     Timeout was called at which point the patient's name, procedure and operative site was confirmed by the operative team. Initially, the instruments for  the vaginal manipulator were positioned, a speculum was placed in the vagina. The anterior lip of the cervix was grasped and The VCare vaginal manipulator was then inserted. Attention was then turned to the upper abdomen. Initially, a stab incision was made at the level of the umbilicus. The umbilicus was then elevated and the Veress needle introduced through this stab incision. The abdomen was insufflated with an opening pressure of 2 mmHg. The Veress needle was removed. Sites for the da Donita laparoscopic ports were demarcated, total of 5, initiating' midline approximately 4 cm above the umbilicus and positioned in a semicircular fashion around the upper abdomen. The initial midline 8 mm port was inserted without difficulties, the left 2 lateral 8 mm da Donita ports were inserted and 2 more 8 mm on the right, all under direct visualization without any vascular injury noted. At this point, the patient was placed into steep Trendelenburg. The pelvis was inspected as well as the upper abdomen as noted above. Pelvic washings were obtained. Bowels were packed up into the upper abdomen with gentle traction. At this point, da Donita was docked onto the ports, all appropriate instruments were inserted.     The left round ligament was transected and the retroperitoneum was opened.  We were able to visualize the course of the ureter deep in the pelvis. The mesosalpinx was then transected and the tube was  from the ovary. The utero-ovarian vessels were isolated and the biopolar was used to ligate them prior to transecting them. The anterior leaf of the broad ligament was developed down toward the bladder. The same was done on the right side. The bladder flap was developed well at the upper vagina. The uterus had adequate mobility, bilateral uterine arteries could be isolated and these were cauterized with the bipolar cautery and transected. Initially on the right, we extended along the cardinal and uterosacral  ligaments, staying close to the cervix to the level of the upper vagina. This was cauterized and transected using the bipolar cautery. Similarly, we isolated out the left cardinal ligament and uterosacral ligament which were cauterized and transected. At this point, we were able to palpate the line of the VCare cup at the level of the upper vagina in a circumferential manner. We incised along the upper vagina to resect the cervix and uterus. The specimen was brought out through the vaginal opening.    The vaginal balloon was again inserted to obtain adequate insufflation. The vaginal cuff had been well-developed and clearly the bladder was out of the way. The vaginal cuff was closed with two V-lock sutures initiating at the right and left apex with excellent reapproximation and overlapping in the middle. Hemostasis was assured.     Next, we performed cystoscopy using 30-degree angled scope. We noted normal bladder mucosa and efflux from the bilateral ureteral orifices. At this point, the vaginal balloon was removed from the vagina. All laparoscopic instruments and ports were now removed and CO2 allowed to escape from the abdomen. Skin was reapproximated with 4-0 Vicryl sutures and appropriate dressing applied.     Sponge, lap and needle counts were reported as correct x2 and instrument count was correct x1.     Dr Norwood was present and scrubbed throughout the entire procedure.    Oxana Sommer MD  PGY-3    Iraida Norwood MD    Department of Ob/Gyn and Women's Health  Division of Gynecologic Oncology  [facility]  [phone]    I was scrubbed and present for the entire procedure.

## 2019-03-07 NOTE — TELEPHONE ENCOUNTER
Post-Discharge Phone Call:    Pain:  1) Location: abdomen, yesterday left side, today right side  2) Rate pain on scale 1-10: 6/10 with activity  3) Is your pain well controlled on your pain medication?: Yes  4) How often are you taking your pain medication?: every 4-6 hrs alternating Tylenol and ibuprofen. Took 1 oxycodone yesterday.    GI:  5) Last bowel movement: prior to surgery  6) Are you having regular bowel movements? No. Took 1 stool softener last night and 1 this morning. Advised if she does not have bowel movement today she should take 2 at HS  7) Eating/drinking well?: Yes  8) Nausea?: No    Urinary:  9) Are you having problems or difficulty with urination? No      Lower Extremities:  10) Were lymph nodes removed during surgery?  N/A  11) If yes, have you been offered a lymphedema consult appointment?  N/A  12) Any pain, redness, or swelling in legs?  No  13) Any area on your legs that are warm to touch? No  14) Chest pain or severe shortness of breath? No    Wound:  15) Type of incision: laparoscopic  Any of the following:  - Drainage (color, amount): No still covered  - Odor: No  - Redness: dressing still covering  - Chills: No  - Fever: No  16) Staples - Have you had your staples out yet? (staples should be removed 7-10 days post-op): N/A  17) Pt was reminded to wash incision (allow warm, soapy water to run over incision): Yes    Post-op:  18) Verify date and time of appointment: 3/14/19 [facility]  19) Pt was informed that pathology will be discussed at this appointment Yes    Any other questions or concerns at this time? No  Bette Singletary RN  BSN

## 2019-03-12 ENCOUNTER — CARE COORDINATION (OUTPATIENT)
Dept: ONCOLOGY | Facility: CLINIC | Age: 49
End: 2019-03-12

## 2019-03-12 NOTE — PROGRESS NOTES
Consult Notes on Referred Patient    Date: 2019     Dr. Pka referring provider defined for this encounter.     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 She is s/p DaVinci TLH and bilateral salpingectomy on 3/6/19 and is here today for follow up of pathology and treatment planning.      Brief History:  Cyndee presented for placement of an IUD originally and had a routine pap smear. That pap smear came back abnormal as ascus and subsequent colposcopy was done which revealed AIN and CIN1. She has an IUD in now. She otherwise is healthy - she has no history of heart disease, lung disease and diabetes. Recommend CKC to check for skip lesions, other disease, would then recommend hysterectomy.  - 2 children, both vaginal deliveries.   - She works as an , lives in [place].     18: colposcopy  Final Diagnosis:  A. Cervix, 12:00, biopsy: adenocarcinoma in situ. Low grade squamous intraepithelial lesion (CIN1)   B. Endocervix, curettage: acute and chronic cervicitis.   ASCUS/HPV18 positive    19: biopsy of cervix 2:00  FINAL DIAGNOSIS:   CERVIX, 2 O'CLOCK, BIOPSY:   - Fragments of cervical mucosa with inflammatory infiltrate and benign   glands consistent with recent biopsy   changes   - Negative for dysplasia or malignancy     19: EUA, removal of IUD, colposcopy, CKC biopsy  Pathology:   A. CERVICAL CONE:   - High grade squamous intraepithelial lesion (cervical intraepithelial   neoplasia 2)   - Both ecto-and endocervical margins are negative for dysplasia   - Regenerative/reparative changes consistent with recent biopsy site   - No residual glandular neoplasia present     B. ENDOCERVICAL CURETTING:   - Endometrial fragments with inactive glands and decidualized stroma,   consistent with exogenous progesterone   effect   - Rare unremarkable  endocervical mucosal fragments     3/6/19: Gigi TLH, bilateral salpingectomy-   Specimen #: [id number]   Collected: 3/6/2019   Received: 3/6/2019   Reported: 3/13/2019 16:57   Ordering Phy(s): BEAR CARRERA     For improved result formatting, select 'View Enhanced Report Format' under    Linked Documents section.     SPECIMEN(S):   Uterus, cervix, and bilateral fallopian tubes     FINAL DIAGNOSIS:   UTERUS, CERVIX, AND BILATERAL FALLOPIAN TUBES, HYSTERECTOMY WITH BILATERAL    SALPINGECTOMY:   - Proliferative-type endometrium   - Adenomyosis   - Subserosal leiomyoma (0.2 cm)   - Cervix with reactive/reparative changes consistent with recent surgery.    Suture material identified   - Cervix with nabothian cysts, no residual dysplasia present   - Fallopian tubes with no significant histologic abnormality   - Negative for malignancy     Today: Cyndee presents to clinic today feeling well. She denies abdominal pain or pelvic pain. To vaginal bleeding or abnormal spotting. No lower extremity pain or edema. No nausea or vomiting. No fever or chills. No hot flashes. No urinary issues. No bowel issues. She is eating and drinking well.    Review of Systems:  Answers for HPI/ROS submitted by the patient on 3/12/2019   General Symptoms: No  Skin Symptoms: No  HENT Symptoms: No  EYE SYMPTOMS: No  HEART SYMPTOMS: No  LUNG SYMPTOMS: No  INTESTINAL SYMPTOMS: No  URINARY SYMPTOMS: No  GYNECOLOGIC SYMPTOMS: No  BREAST SYMPTOMS: No  SKELETAL SYMPTOMS: No  BLOOD SYMPTOMS: No  NERVOUS SYSTEM SYMPTOMS: No  MENTAL HEALTH SYMPTOMS: No    I have reviewed and addressed the patient's review of symptoms for today's visit.     Past Medical History:  - DARRICK  - seasonal allergies    Past Surgical History:  No past surgical history     Health Maintenance:  Health Maintenance Due   Topic Date Due     PREVENTIVE CARE VISIT  1970     HIV SCREEN (SYSTEM ASSIGNED)  01/25/1989     PAP SCREENING Q3 YR (SYSTEM ASSIGNED)  01/25/1992      "DTAP/TDAP/TD IMMUNIZATION (1 - Tdap) 01/25/1996     LIPID SCREEN Q5 YR FEMALE (SYSTEM ASSIGNED)  01/25/2016     INFLUENZA VACCINE (1) 09/01/2018     Last Pap Smear: 11/2/18 Result: ASCUS    Last Mammogram: 7/20/18              Result: normal      She has not had a history of abnormal mammograms.    Last Colonoscopy: Not done                    Current Medications:   has a current medication list which includes the following prescription(s): acetaminophen, alprazolam, ibuprofen, and senna-docusate.     Allergies:   Allergies   Allergen Reactions     Amoxicillin Rash      Social History:  Social History     Tobacco Use     Smoking status: Never Smoker     Smokeless tobacco: Never Used   Substance Use Topics     Alcohol use: Yes     Comment: occasional     History   Drug Use No     Family History:   The patient's family history is notable for:  No family history of cancer.     Physical Exam:   /88   Pulse 101   Temp 98.4  F (36.9  C) (Oral)   Resp 16   Ht 1.676 m (5' 6\")   Wt 68 kg (150 lb)   SpO2 97%   Breastfeeding? No   BMI 24.21 kg/m    Body mass index is 24.21 kg/m .    General Appearance: healthy and alert, no distress      Psychiatric: appropriate mood and affect        Gastrointestinal: blisters along abdomen due to adhesive or latex tape. Patient denies pain associated with this. Robotic incisions healing well.          Genitourinary: Deferred       Assessment:    Cyndee Combs is a 49 year old woman with a diagnosis of AIS of cervix and CIN2. S/p DaVinci TLH and bilateral salpingectomy, no remaining disease.     Plan:    1.)   Adenocarcinoma in situ: s/p DaVinci hysterectomy, no evidence of disease on final pathology. Recommend yearly pap smears due to dysplasia and hx of AIS. Can do this with primary OB/GYN. Instructed patient to continue with no heavy lifting for 6 weeks post surgery and nothing per vagina x 6 weeks. Reviewed signs and symptoms for when she should contact the clinic or seek " additional care. Patient to contact the clinic with any questions or concerns in the interim.     2.) Genetic risk factors were assessed and the patient does not meet the qualifications for a referral.      3.) Labs and/or tests ordered include:  Yearly pelvic exams.      4.) Health maintenance issues addressed today include: patient is UTD with pap and mammogram. Pt has not had colonoscopy.       Thank you for allowing us to participate in the care of your patient.       Sincerely,    Iraida Norwood MD    Department of Ob/Gyn and Women's Health  Division of Gynecologic Oncology  [facility]  [phone]    A total of 15 minutes of face to face time were spent with the patient with over 50% of that time spent in counseling, coordination of care, education, and symptom management.    CC  Patient Care Team:  Rosalinda Hickman NP as PCP - General    I, Varun Valladares, am serving as a scribe to document services personally performed by Iraida Norwood MD, based upon my observations and the provider's statements to me. All documentation has been reviewed by the aforementioned doctor prior to being entered into the official medical record.     I have reviewed the above note and agree with the scribe's notation as written.

## 2019-03-13 LAB — COPATH REPORT: NORMAL

## 2019-03-14 ENCOUNTER — OFFICE VISIT (OUTPATIENT)
Dept: ONCOLOGY | Facility: CLINIC | Age: 49
End: 2019-03-14
Attending: OBSTETRICS & GYNECOLOGY
Payer: COMMERCIAL

## 2019-03-14 VITALS
SYSTOLIC BLOOD PRESSURE: 125 MMHG | HEART RATE: 101 BPM | OXYGEN SATURATION: 97 % | RESPIRATION RATE: 16 BRPM | WEIGHT: 150 LBS | HEIGHT: 66 IN | BODY MASS INDEX: 24.11 KG/M2 | DIASTOLIC BLOOD PRESSURE: 88 MMHG | TEMPERATURE: 98.4 F

## 2019-03-14 DIAGNOSIS — Z98.890 POSTOPERATIVE STATE: ICD-10-CM

## 2019-03-14 DIAGNOSIS — D06.9 ADENOCARCINOMA IN SITU (AIS) OF UTERINE CERVIX: ICD-10-CM

## 2019-03-14 DIAGNOSIS — R87.810 CERVICAL HIGH RISK HPV (HUMAN PAPILLOMAVIRUS) TEST POSITIVE: Primary | ICD-10-CM

## 2019-03-14 PROBLEM — J30.2 SEASONAL ALLERGIES: Status: ACTIVE | Noted: 2019-03-14

## 2019-03-14 PROBLEM — F41.1 GAD (GENERALIZED ANXIETY DISORDER): Status: ACTIVE | Noted: 2019-03-01

## 2019-03-14 PROCEDURE — 99024 POSTOP FOLLOW-UP VISIT: CPT | Mod: ZP | Performed by: OBSTETRICS & GYNECOLOGY

## 2019-03-14 PROCEDURE — G0463 HOSPITAL OUTPT CLINIC VISIT: HCPCS | Mod: ZF

## 2019-03-14 ASSESSMENT — MIFFLIN-ST. JEOR: SCORE: 1327.15

## 2019-03-14 ASSESSMENT — PAIN SCALES - GENERAL: PAINLEVEL: NO PAIN (0)

## 2019-03-14 NOTE — LETTER
3/14/2019     RE: Cyndee Combs  [address] [facility] [address]     Dear Colleague,    Thank you for referring your patient, Cyndee Combs, to the [facility]. Please see a copy of my visit note below.                            Consult Notes on Referred Patient    Date: 2019     Dr. Pak referring provider defined for this encounter.     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 She is s/p DaVinci TLH and bilateral salpingectomy on 3/6/19 and is here today for follow up of pathology and treatment planning.      Brief History:  Cyndee presented for placement of an IUD originally and had a routine pap smear. That pap smear came back abnormal as ascus and subsequent colposcopy was done which revealed AIN and CIN1. She has an IUD in now. She otherwise is healthy - she has no history of heart disease, lung disease and diabetes. Recommend CKC to check for skip lesions, other disease, would then recommend hysterectomy.  - 2 children, both vaginal deliveries.   - She works as an , lives in [place].     18: colposcopy  Final Diagnosis:  A. Cervix, 12:00, biopsy: adenocarcinoma in situ. Low grade squamous intraepithelial lesion (CIN1)   B. Endocervix, curettage: acute and chronic cervicitis.   ASCUS/HPV18 positive    19: biopsy of cervix 2:00  FINAL DIAGNOSIS:   CERVIX, 2 O'CLOCK, BIOPSY:   - Fragments of cervical mucosa with inflammatory infiltrate and benign   glands consistent with recent biopsy   changes   - Negative for dysplasia or malignancy     19: EUA, removal of IUD, colposcopy, CKC biopsy  Pathology:   A. CERVICAL CONE:   - High grade squamous intraepithelial lesion (cervical intraepithelial   neoplasia 2)   - Both ecto-and endocervical margins are negative for dysplasia   - Regenerative/reparative changes consistent  with recent biopsy site   - No residual glandular neoplasia present     B. ENDOCERVICAL CURETTING:   - Endometrial fragments with inactive glands and decidualized stroma,   consistent with exogenous progesterone   effect   - Rare unremarkable endocervical mucosal fragments     3/6/19: Gigi TLH, bilateral salpingectomy-   Specimen #: [id number]   Collected: 3/6/2019   Received: 3/6/2019   Reported: 3/13/2019 16:57   Ordering Phy(s): BEAR CARRERA     For improved result formatting, select 'View Enhanced Report Format' under    Linked Documents section.     SPECIMEN(S):   Uterus, cervix, and bilateral fallopian tubes     FINAL DIAGNOSIS:   UTERUS, CERVIX, AND BILATERAL FALLOPIAN TUBES, HYSTERECTOMY WITH BILATERAL    SALPINGECTOMY:   - Proliferative-type endometrium   - Adenomyosis   - Subserosal leiomyoma (0.2 cm)   - Cervix with reactive/reparative changes consistent with recent surgery.    Suture material identified   - Cervix with nabothian cysts, no residual dysplasia present   - Fallopian tubes with no significant histologic abnormality   - Negative for malignancy     Today: Cyndee presents to clinic today feeling well. She denies abdominal pain or pelvic pain. To vaginal bleeding or abnormal spotting. No lower extremity pain or edema. No nausea or vomiting. No fever or chills. No hot flashes. No urinary issues. No bowel issues. She is eating and drinking well.    Review of Systems:  Answers for HPI/ROS submitted by the patient on 3/12/2019   General Symptoms: No  Skin Symptoms: No  HENT Symptoms: No  EYE SYMPTOMS: No  HEART SYMPTOMS: No  LUNG SYMPTOMS: No  INTESTINAL SYMPTOMS: No  URINARY SYMPTOMS: No  GYNECOLOGIC SYMPTOMS: No  BREAST SYMPTOMS: No  SKELETAL SYMPTOMS: No  BLOOD SYMPTOMS: No  NERVOUS SYSTEM SYMPTOMS: No  MENTAL HEALTH SYMPTOMS: No    I have reviewed and addressed the patient's review of symptoms for today's visit.     Past Medical History:  - DARRICK  - seasonal allergies    Past Surgical  "History:  No past surgical history     Health Maintenance:  Health Maintenance Due   Topic Date Due     PREVENTIVE CARE VISIT  1970     HIV SCREEN (SYSTEM ASSIGNED)  01/25/1989     PAP SCREENING Q3 YR (SYSTEM ASSIGNED)  01/25/1992     DTAP/TDAP/TD IMMUNIZATION (1 - Tdap) 01/25/1996     LIPID SCREEN Q5 YR FEMALE (SYSTEM ASSIGNED)  01/25/2016     INFLUENZA VACCINE (1) 09/01/2018     Last Pap Smear: 11/2/18 Result: ASCUS    Last Mammogram: 7/20/18              Result: normal      She has not had a history of abnormal mammograms.    Last Colonoscopy: Not done                    Current Medications:   has a current medication list which includes the following prescription(s): acetaminophen, alprazolam, ibuprofen, and senna-docusate.     Allergies:   Allergies   Allergen Reactions     Amoxicillin Rash      Social History:  Social History     Tobacco Use     Smoking status: Never Smoker     Smokeless tobacco: Never Used   Substance Use Topics     Alcohol use: Yes     Comment: occasional     History   Drug Use No     Family History:   The patient's family history is notable for:  No family history of cancer.     Physical Exam:   /88   Pulse 101   Temp 98.4  F (36.9  C) (Oral)   Resp 16   Ht 1.676 m (5' 6\")   Wt 68 kg (150 lb)   SpO2 97%   Breastfeeding? No   BMI 24.21 kg/m     Body mass index is 24.21 kg/m .    General Appearance: healthy and alert, no distress      Psychiatric: appropriate mood and affect        Gastrointestinal: blisters along abdomen due to adhesive or latex tape. Patient denies pain associated with this. Robotic incisions healing well.          Genitourinary: Deferred       Assessment:    Cyndee Combs is a 49 year old woman with a diagnosis of AIS of cervix and CIN2. S/p DaVinci TLH and bilateral salpingectomy, no remaining disease.     Plan:    1.)   Adenocarcinoma in situ: s/p DaVinci hysterectomy, no evidence of disease on final pathology. Recommend yearly pap smears due to " dysplasia and hx of AIS. Can do this with primary OB/GYN. Instructed patient to continue with no heavy lifting for 6 weeks post surgery and nothing per vagina x 6 weeks. Reviewed signs and symptoms for when she should contact the clinic or seek additional care. Patient to contact the clinic with any questions or concerns in the interim.     2.) Genetic risk factors were assessed and the patient does not meet the qualifications for a referral.      3.) Labs and/or tests ordered include:  Yearly pelvic exams.      4.) Health maintenance issues addressed today include: patient is UTD with pap and mammogram. Pt has not had colonoscopy.     Thank you for allowing us to participate in the care of your patient.       Sincerely,    Iraida Norwood MD    Department of Ob/Gyn and Women's Health  Division of Gynecologic Oncology  [facility]  [phone]    A total of 15 minutes of face to face time were spent with the patient with over 50% of that time spent in counseling, coordination of care, education, and symptom management.    CC  Patient Care Team:  Rosalinda Hickman NP as PCP - General    IVarun, am serving as a scribe to document services personally performed by Iraida Norwood MD, based upon my observations and the provider's statements to me. All documentation has been reviewed by the aforementioned doctor prior to being entered into the official medical record.     I have reviewed the above note and agree with the scribe's notation as written.    Again, thank you for allowing me to participate in the care of your patient.      Sincerely,    Iraida Norwood MD

## 2019-03-14 NOTE — NURSING NOTE
"Oncology Rooming Note    March 14, 2019 8:23 AM   Cyndee Combs is a 49 year old female who presents for:    Chief Complaint   Patient presents with     Oncology Clinic Visit     Return Uterine Ca; Post op     Initial Vitals: /86   Pulse 101   Temp 98.4  F (36.9  C) (Oral)   Resp 16   Ht 1.676 m (5' 6\")   Wt 68 kg (150 lb)   SpO2 97%   Breastfeeding? No   BMI 24.21 kg/m   Estimated body mass index is 24.21 kg/m  as calculated from the following:    Height as of this encounter: 1.676 m (5' 6\").    Weight as of this encounter: 68 kg (150 lb). Body surface area is 1.78 meters squared.  No Pain (0) Comment: Data Unavailable   No LMP recorded. Patient has had a hysterectomy.  Allergies reviewed: Yes  Medications reviewed: Yes    Medications: Medication refills not needed today.  Pharmacy name entered into [employer]: [facility] - [place], [place] - [address]    Clinical concerns: Post op; patient has no concerns       Gemma Shi CMA              "

## 2019-11-08 ENCOUNTER — HEALTH MAINTENANCE LETTER (OUTPATIENT)
Age: 49
End: 2019-11-08

## 2020-02-23 ENCOUNTER — HEALTH MAINTENANCE LETTER (OUTPATIENT)
Age: 50
End: 2020-02-23

## 2020-12-06 ENCOUNTER — HEALTH MAINTENANCE LETTER (OUTPATIENT)
Age: 50
End: 2020-12-06

## 2021-02-20 ENCOUNTER — HEALTH MAINTENANCE LETTER (OUTPATIENT)
Age: 51
End: 2021-02-20

## 2021-09-25 ENCOUNTER — HEALTH MAINTENANCE LETTER (OUTPATIENT)
Age: 51
End: 2021-09-25

## 2022-01-15 ENCOUNTER — HEALTH MAINTENANCE LETTER (OUTPATIENT)
Age: 52
End: 2022-01-15

## 2022-03-12 ENCOUNTER — HEALTH MAINTENANCE LETTER (OUTPATIENT)
Age: 52
End: 2022-03-12

## 2023-04-22 ENCOUNTER — HEALTH MAINTENANCE LETTER (OUTPATIENT)
Age: 53
End: 2023-04-22

## 2024-04-14 NOTE — TELEPHONE ENCOUNTER
Records received from [facility] and sent to HIM    [FreeTextEntry1] : CPE [de-identified] : ACCOMPANIED BY HER DAUGHTER. PT IS FEELING WELL TODAY.

## (undated) DEVICE — GLOVE PROTEXIS W/NEU-THERA 6.5  2D73TE65

## (undated) DEVICE — JELLY LUBRICATING SURGILUBE 2OZ TUBE

## (undated) DEVICE — ESU GROUND PAD ADULT W/CORD E7507

## (undated) DEVICE — KOH COLPOTOMIZER OCCLUDER  CPO-6

## (undated) DEVICE — ESU ELEC BLADE 6" COATED E1450-6

## (undated) DEVICE — RETR ELEV / UTERINE MANIPULATOR V-CARE MED CUP 60-6085-201A

## (undated) DEVICE — SOL NACL 0.9% INJ 1000ML BAG 2B1324X

## (undated) DEVICE — SU VICRYL 0 TIE 54" J608H

## (undated) DEVICE — Device

## (undated) DEVICE — PROTECTOR ARM ONE-STEP TRENDELENBURG 40418

## (undated) DEVICE — DRAPE LEGGINGS CLEAR 8430

## (undated) DEVICE — SU VICRYL 2-0 CT-2 27" J333H

## (undated) DEVICE — GLOVE PROTEXIS BLUE W/NEU-THERA 7.0  2D73EB70

## (undated) DEVICE — SUCTION IRR STRYKERFLOW II W/TIP 250-070-520

## (undated) DEVICE — SOL WATER IRRIG 1000ML BOTTLE 2F7114

## (undated) DEVICE — DRSG TELFA 3X8" 1238

## (undated) DEVICE — SYSTEM CLEARIFY VISUALIZATION 21-345

## (undated) DEVICE — DAVINCI XI FCP BIPOLAR FENESTRATED 470205

## (undated) DEVICE — PAD CHUX UNDERPAD 30X30"

## (undated) DEVICE — KIT PATIENT POSITIONING PIGAZZI LATEX FREE 40580

## (undated) DEVICE — DAVINCI XI SEAL UNIVERSAL 5-8MM 470361

## (undated) DEVICE — DAVINCI XI GRASPER FENESTRATED TIP UP 8MM 470347

## (undated) DEVICE — LINEN TOWEL PACK X6 WHITE 5487

## (undated) DEVICE — PREP TECHNI-CARE CHLOROXYLENOL 3% 4OZ BOTTLE C222-4ZWO

## (undated) DEVICE — DAVINCI XI DRAPE COLUMN 470341

## (undated) DEVICE — SOL WATER IRRIG 500ML BOTTLE 2F7113

## (undated) DEVICE — LINEN TOWEL PACK X5 5464

## (undated) DEVICE — TUBING CONMED AIRSEAL SMOKE EVAC INSUFFLATION ASM-EVAC

## (undated) DEVICE — DRAPE SHEET REV FOLD 3/4 9349

## (undated) DEVICE — DAVINCI XI MONOPOLAR SCISSORS HOT SHEARS 8MM 470179

## (undated) DEVICE — SU VICRYL 2-0 CT-2 27" UND J269H

## (undated) DEVICE — DRAPE MAYO STAND 23X54 8337

## (undated) DEVICE — DRSG PRIMAPORE 02X3" 7133

## (undated) DEVICE — DAVINCI HOT SHEARS TIP COVER  400180

## (undated) DEVICE — LINEN TOWEL PACK X30 5481

## (undated) DEVICE — NDL INSUFFLATION 13GA 120MM C2201

## (undated) DEVICE — CATH INTERMITTENT CLEAN-CATH 16FR 16" VINYL LF 421716

## (undated) DEVICE — ENDO OBTURATOR ACCESS PORT BLADELESS 8X100MM IAS8-100LP

## (undated) DEVICE — SOL NACL 0.9% IRRIG 1000ML BOTTLE 2F7124

## (undated) DEVICE — SU VICRYL 4-0 PS-2 18" UND J496H

## (undated) DEVICE — SOL ADH LIQUID BENZOIN SWAB 0.6ML C1544

## (undated) DEVICE — DAVINCI OBTURATOR 8MM BLUNT LONG 470009

## (undated) DEVICE — DAVINCI XI DRAPE ARM 470015

## (undated) DEVICE — DEVICE SUTURE GRASPER TROCAR CLOSURE 14GA PMITCSG

## (undated) DEVICE — TUBING IRRIG CYSTO/BLADDER SET 81" LF 2C4040

## (undated) DEVICE — PACK VAG HYST

## (undated) DEVICE — SUCTION MANIFOLD DORNOCH ULTRA CART UL-CL500

## (undated) RX ORDER — BUPIVACAINE HYDROCHLORIDE 5 MG/ML
INJECTION, SOLUTION EPIDURAL; INTRACAUDAL
Status: DISPENSED
Start: 2019-03-06

## (undated) RX ORDER — LIDOCAINE HYDROCHLORIDE 20 MG/ML
INJECTION, SOLUTION EPIDURAL; INFILTRATION; INTRACAUDAL; PERINEURAL
Status: DISPENSED
Start: 2019-03-06

## (undated) RX ORDER — BUPIVACAINE HYDROCHLORIDE AND EPINEPHRINE 5; 5 MG/ML; UG/ML
INJECTION, SOLUTION EPIDURAL; INTRACAUDAL; PERINEURAL
Status: DISPENSED
Start: 2019-03-06

## (undated) RX ORDER — PHENYLEPHRINE HCL IN 0.9% NACL 1 MG/10 ML
SYRINGE (ML) INTRAVENOUS
Status: DISPENSED
Start: 2019-01-17

## (undated) RX ORDER — BACITRACIN 50000 [IU]/1
INJECTION, POWDER, FOR SOLUTION INTRAMUSCULAR
Status: DISPENSED
Start: 2019-03-06

## (undated) RX ORDER — ACETAMINOPHEN 325 MG/1
TABLET ORAL
Status: DISPENSED
Start: 2019-03-06

## (undated) RX ORDER — PROPOFOL 10 MG/ML
INJECTION, EMULSION INTRAVENOUS
Status: DISPENSED
Start: 2019-01-17

## (undated) RX ORDER — FENTANYL CITRATE 50 UG/ML
INJECTION, SOLUTION INTRAMUSCULAR; INTRAVENOUS
Status: DISPENSED
Start: 2019-03-06

## (undated) RX ORDER — DEXAMETHASONE SODIUM PHOSPHATE 4 MG/ML
INJECTION, SOLUTION INTRA-ARTICULAR; INTRALESIONAL; INTRAMUSCULAR; INTRAVENOUS; SOFT TISSUE
Status: DISPENSED
Start: 2019-03-06

## (undated) RX ORDER — SCOLOPAMINE TRANSDERMAL SYSTEM 1 MG/1
PATCH, EXTENDED RELEASE TRANSDERMAL
Status: DISPENSED
Start: 2019-03-06

## (undated) RX ORDER — PROPOFOL 10 MG/ML
INJECTION, EMULSION INTRAVENOUS
Status: DISPENSED
Start: 2019-03-06

## (undated) RX ORDER — FENTANYL CITRATE 50 UG/ML
INJECTION, SOLUTION INTRAMUSCULAR; INTRAVENOUS
Status: DISPENSED
Start: 2019-01-17

## (undated) RX ORDER — ONDANSETRON 2 MG/ML
INJECTION INTRAMUSCULAR; INTRAVENOUS
Status: DISPENSED
Start: 2019-03-06

## (undated) RX ORDER — HYDROMORPHONE HYDROCHLORIDE 1 MG/ML
INJECTION, SOLUTION INTRAMUSCULAR; INTRAVENOUS; SUBCUTANEOUS
Status: DISPENSED
Start: 2019-03-06

## (undated) RX ORDER — ONDANSETRON 2 MG/ML
INJECTION INTRAMUSCULAR; INTRAVENOUS
Status: DISPENSED
Start: 2019-01-17

## (undated) RX ORDER — CIPROFLOXACIN 2 MG/ML
INJECTION, SOLUTION INTRAVENOUS
Status: DISPENSED
Start: 2019-03-06

## (undated) RX ORDER — BUPIVACAINE HYDROCHLORIDE 5 MG/ML
INJECTION, SOLUTION EPIDURAL; INTRACAUDAL
Status: DISPENSED
Start: 2019-01-17

## (undated) RX ORDER — SCOLOPAMINE TRANSDERMAL SYSTEM 1 MG/1
PATCH, EXTENDED RELEASE TRANSDERMAL
Status: DISPENSED
Start: 2019-01-17

## (undated) RX ORDER — DEXAMETHASONE SODIUM PHOSPHATE 4 MG/ML
INJECTION, SOLUTION INTRA-ARTICULAR; INTRALESIONAL; INTRAMUSCULAR; INTRAVENOUS; SOFT TISSUE
Status: DISPENSED
Start: 2019-01-17

## (undated) RX ORDER — PHENAZOPYRIDINE HYDROCHLORIDE 200 MG/1
TABLET, FILM COATED ORAL
Status: DISPENSED
Start: 2019-03-06

## (undated) RX ORDER — ACETIC ACID 5 %
LIQUID (ML) MISCELLANEOUS
Status: DISPENSED
Start: 2019-01-17

## (undated) RX ORDER — KETOROLAC TROMETHAMINE 30 MG/ML
INJECTION, SOLUTION INTRAMUSCULAR; INTRAVENOUS
Status: DISPENSED
Start: 2019-01-17

## (undated) RX ORDER — FERRIC SUBSULFATE 0.21 G/G
LIQUID TOPICAL
Status: DISPENSED
Start: 2019-01-17

## (undated) RX ORDER — OXYCODONE HYDROCHLORIDE 5 MG/1
TABLET ORAL
Status: DISPENSED
Start: 2019-03-06

## (undated) RX ORDER — HEPARIN SODIUM 1000 [USP'U]/ML
INJECTION, SOLUTION INTRAVENOUS; SUBCUTANEOUS
Status: DISPENSED
Start: 2019-03-06

## (undated) RX ORDER — LIDOCAINE HYDROCHLORIDE 20 MG/ML
INJECTION, SOLUTION EPIDURAL; INFILTRATION; INTRACAUDAL; PERINEURAL
Status: DISPENSED
Start: 2019-01-17

## (undated) RX ORDER — ACETAMINOPHEN 325 MG/1
TABLET ORAL
Status: DISPENSED
Start: 2019-01-17

## (undated) RX ORDER — GABAPENTIN 300 MG/1
CAPSULE ORAL
Status: DISPENSED
Start: 2019-01-17